# Patient Record
Sex: MALE | Race: WHITE | NOT HISPANIC OR LATINO | Employment: OTHER | URBAN - METROPOLITAN AREA
[De-identification: names, ages, dates, MRNs, and addresses within clinical notes are randomized per-mention and may not be internally consistent; named-entity substitution may affect disease eponyms.]

---

## 2017-08-28 ENCOUNTER — TELEPHONE (OUTPATIENT)
Dept: NEUROLOGY | Facility: CLINIC | Age: 62
End: 2017-08-28

## 2017-08-28 NOTE — TELEPHONE ENCOUNTER
----- Message from Alis Tavares sent at 8/25/2017  2:06 PM CDT -----  Patient is requesting to schedule, was referred by Dr. Redman for Vertigo, contact patient to advise if  will be able to see him for this issue, [phone].    Thank you

## 2017-08-31 ENCOUNTER — HOSPITAL ENCOUNTER (OUTPATIENT)
Dept: RADIOLOGY | Facility: HOSPITAL | Age: 62
Discharge: HOME | End: 2017-08-31
Attending: PHYSICAL MEDICINE & REHABILITATION
Payer: COMMERCIAL

## 2017-08-31 DIAGNOSIS — R42 VERTIGO: ICD-10-CM

## 2017-08-31 PROCEDURE — 72141 MRI NECK SPINE W/O DYE: CPT | Mod: TC

## 2017-08-31 PROCEDURE — 72141 MRI NECK SPINE W/O DYE: CPT | Mod: 26,,, | Performed by: RADIOLOGY

## 2017-09-07 PROBLEM — R42 VERTIGO: Chronic | Status: ACTIVE | Noted: 2017-09-07

## 2017-09-21 ENCOUNTER — TELEPHONE (OUTPATIENT)
Dept: NEUROLOGY | Facility: CLINIC | Age: 62
End: 2017-09-21

## 2017-09-21 ENCOUNTER — LAB VISIT (OUTPATIENT)
Dept: LAB | Facility: HOSPITAL | Age: 62
End: 2017-09-21
Attending: PSYCHIATRY & NEUROLOGY
Payer: COMMERCIAL

## 2017-09-21 ENCOUNTER — OFFICE VISIT (OUTPATIENT)
Dept: NEUROLOGY | Facility: CLINIC | Age: 62
End: 2017-09-21
Payer: COMMERCIAL

## 2017-09-21 VITALS
HEART RATE: 61 BPM | HEIGHT: 73 IN | DIASTOLIC BLOOD PRESSURE: 98 MMHG | BODY MASS INDEX: 28.68 KG/M2 | SYSTOLIC BLOOD PRESSURE: 151 MMHG | RESPIRATION RATE: 20 BRPM | WEIGHT: 216.38 LBS

## 2017-09-21 DIAGNOSIS — R42 VERTIGO: ICD-10-CM

## 2017-09-21 DIAGNOSIS — R42 VERTIGO: Primary | ICD-10-CM

## 2017-09-21 LAB
CREAT SERPL-MCNC: 1.1 MG/DL
EST. GFR  (AFRICAN AMERICAN): >60 ML/MIN/1.73 M^2
EST. GFR  (NON AFRICAN AMERICAN): >60 ML/MIN/1.73 M^2

## 2017-09-21 PROCEDURE — 82565 ASSAY OF CREATININE: CPT

## 2017-09-21 PROCEDURE — 3080F DIAST BP >= 90 MM HG: CPT | Mod: S$GLB,,, | Performed by: PSYCHIATRY & NEUROLOGY

## 2017-09-21 PROCEDURE — 36415 COLL VENOUS BLD VENIPUNCTURE: CPT | Mod: PO

## 2017-09-21 PROCEDURE — 3077F SYST BP >= 140 MM HG: CPT | Mod: S$GLB,,, | Performed by: PSYCHIATRY & NEUROLOGY

## 2017-09-21 PROCEDURE — 3008F BODY MASS INDEX DOCD: CPT | Mod: S$GLB,,, | Performed by: PSYCHIATRY & NEUROLOGY

## 2017-09-21 PROCEDURE — 99999 PR PBB SHADOW E&M-EST. PATIENT-LVL V: CPT | Mod: PBBFAC,,, | Performed by: PSYCHIATRY & NEUROLOGY

## 2017-09-21 PROCEDURE — 99205 OFFICE O/P NEW HI 60 MIN: CPT | Mod: S$GLB,,, | Performed by: PSYCHIATRY & NEUROLOGY

## 2017-09-21 RX ORDER — MECLIZINE HYDROCHLORIDE 25 MG/1
25 TABLET ORAL 3 TIMES DAILY PRN
Qty: 30 TABLET | Refills: 3 | Status: SHIPPED | OUTPATIENT
Start: 2017-09-21 | End: 2019-08-16

## 2017-09-21 NOTE — PROGRESS NOTES
Date: 9/21/2017    Patient ID: Michael Rivero is a 62 y.o. male.    Referring Provider:  Patrick Sotomayor MD    Chief Complaint: Dizziness      History of Present Illness:  Mr. Rivero is a 62 y.o. male with hypertension and gout who presents referred by Patrick Sotomayor MD today with vertigo.     He states he has had vertigo for the past 4-5 years. He first noticed this when driving and for the most part over the past 4-5 years, it has been intermittent. He describes it as a sensation that the world is moving around him or that he is swimming. This typically happens a few times per day and lasts seconds-minutes. However, about 2 months ago, he had a period of 2-3 weeks where the vertigo was more persistent and more severe. He still did not have trouble walking or any other complaints but generally felt unwell. This has since passed and he is back to having a few short bouts of vertigo feeling daily. He has had to be on short term disability from work due to the severity of the dizziness for those 2-3 weeks but wishes to return to work. Dr. Sotomayor has asked that he be seen by neurology to clear him returning to work.     No headaches. No trouble walking. No falls. No nausea or vomiting. No vision changes with this. No incoordination of hands or legs that he has noticed. He occasionally noticed skipped heartbeats. No hearing loss or ringing in the ears.     He has not had a MRI brain. He had a MRI c spine with some narrowing but no significant cord impingement. He has some occasional neck pain but not something significantly bothersome. He has not seen ENT. He has not taken meclizine. He has not seen vestibular therapy.     Review of Systems:   Comprehensive review of systems is negative except as mentioned in the HPI    Allergies:  Review of patient's allergies indicates:  No Known Allergies    Current Medications:  Current Outpatient Prescriptions   Medication Sig Dispense Refill    allopurinol (ZYLOPRIM) 100  "MG tablet Take 1 tablet (100 mg total) by mouth once daily. 90 tablet 1    metoprolol succinate (TOPROL-XL) 25 MG 24 hr tablet Take 25 mg by mouth once daily.  5    olmesartan (BENICAR) 20 MG tablet Take 1 tablet (20 mg total) by mouth once daily. 1/2 tablet qd 90 tablet 0    meclizine (ANTIVERT) 25 mg tablet Take 1 tablet (25 mg total) by mouth 3 (three) times daily as needed for Dizziness. 30 tablet 3     No current facility-administered medications for this visit.        Past Medical History:  Past Medical History:   Diagnosis Date    Arthritis     gout    Gout     Hypertension     Skin cancer     pre cancer on nose , under care of dermatologist        Past Surgical History:  Past Surgical History:   Procedure Laterality Date    CHOLECYSTECTOMY         Family History:  family history includes COPD in his mother; Emphysema in his mother; Gout in his father; Hyperlipidemia in his brother.    Social History:   reports that he has never smoked. He has never used smokeless tobacco. He reports that he drinks about 0.6 oz of alcohol per week . He reports that he does not use drugs.    Physical Exam:  Vitals:    09/21/17 0954 09/21/17 1055   BP: (!) 181/99 (!) 151/98   Pulse: 62 61   Resp: 20    Weight: 98.1 kg (216 lb 6.1 oz)    Height: 6' 1" (1.854 m)    PainSc: 0-No pain      Body mass index is 28.55 kg/m².  General: Well developed, well nourished.  No acute distress.  HEENT: Atraumatic, normocephalic.  Cardiovascular: No carotid bruits.   Musculoskeletal: No obvious joint deformities, moves all extremities well.  Skin: No obvious rashes      Neurological Exam:  Mental status: Awake, alert, and oriented to person, place, and time. Recent and remote memory appear to be intact.   Speech/Language: No dysarthria or aphasia on conversation.   Cranial nerves: Pupils equal round and reactive to light, no nystagmus, extraocular movements intact, facial strength and sensation intact bilaterally, palate and tongue " midline, hearing grossly intact bilaterally.  Motor: 5 out of 5 strength throughout the upper and lower extremities bilaterally. Normal bulk and tone.   Sensation: Intact to light touch and temperature bilaterally.  DTR: 2+ at the knees and biceps bilaterally.  Coordination: Finger-nose-finger testing and rapid alternating movements normal bilaterally. No tremor.   Gait: Normal gait including heel, toe, and tandem gait though he had slight difficulty with tandem gait.       Data:  I have personally reviewed the referring provider's notes, labs, & imaging made available to me today.       Labs:  CBC:   Lab Results   Component Value Date    WBC 7.8 08/08/2017    HGB 15.4 08/08/2017    HCT 45.2 08/08/2017     08/08/2017    MCV 94.4 08/08/2017    RDW 12.3 08/08/2017     BMP:   Lab Results   Component Value Date     08/08/2017    K 4.2 08/08/2017     08/08/2017    CO2 28 08/08/2017    BUN 11 08/08/2017    CREATININE 1.08 08/08/2017    GLU 88 08/08/2017    CALCIUM 9.1 08/08/2017     LFTS;   Lab Results   Component Value Date    PROT 6.8 08/08/2017    ALBUMIN 4.4 08/08/2017    BILITOT 2.1 (H) 08/08/2017    AST 20 08/08/2017    ALKPHOS 84 08/08/2017    ALT 21 08/08/2017     COAGS: No results found for: INR, PROTIME, PTT  FLP:   Lab Results   Component Value Date    CHOL 183 08/08/2017    HDL 54 08/08/2017    LDLCALC 110 08/08/2017    TRIG 96 08/08/2017    CHOLHDL 3.4 08/08/2017         Imaging:  I have personally reviewed the imaging, MRI c spine reveals multilevel narrowing and foraminal narrowing.     Assessment and Plan:  Mr. Rivero is a 62 y.o. male referred to me by Patrick Sotomayor MD for evaluation of vertigo. The patient has no physical exam findings consistent with either peripheral or central vertigo (no nystagmus, no ataxia, etc). I was unable to provoke vertigo on testing. Per the history, I believe this is likely BPPV but I would like to further evaluate given the more severe bout that  lasted 2-3 weeks. I will obtain MRI brain and vessel imaging. I have prescribed meclizine to help symptomatically. If his imaging is normal, we can proceed with vestibular therapy. A visit with ENT or the Epley maneuver may benefit him in the future if this continues. I do not see anything on his exam today that should limit him returning to work.     We will contact the patient with the results. All questions were answered and they were comfortable with the plan.       Vertigo  -     MRI Brain W WO Contrast; Future; Expected date: 09/21/2017  -     MRA Neck W WO Contrast; Future; Expected date: 09/21/2017  -     MRA Brain; Future; Expected date: 09/21/2017  -     Vestibular test; Future  -     Ambulatory consult to Physical Therapy  -     Creatinine, serum; Future; Expected date: 09/21/2017    Other orders  -     meclizine (ANTIVERT) 25 mg tablet; Take 1 tablet (25 mg total) by mouth 3 (three) times daily as needed for Dizziness.  Dispense: 30 tablet; Refill: 3      The patient was noted to be hypertensive in clinic today.  Repeat BP check confirmed this.  I have asked the patient to follow up with their PCP about this and my staff to message the patient's PCP.

## 2017-09-21 NOTE — LETTER
September 21, 2017      Patrick Sotomayor MD  [address] [place] [address]           [facility]  [address] [facility] [address] [place] [address]  Phone: [phone]  Fax: [phone]          Patient: Michael Rivero   MR Number: [id number]   YOB: 1955   Date of Visit: 9/21/2017       Dear Dr. aPtrick Sotomayor:    Thank you for referring Michael Rivero to me for evaluation. Attached you will find relevant portions of my assessment and plan of care.    If you have questions, please do not hesitate to call me. I look forward to following Michael Rivero along with you.    Sincerely,    Charity Davis MD    Enclosure  CC:  No Recipients    If you would like to receive this communication electronically, please contact [email or web address] or [phone] to request more information on [employer] Link access.    For providers and/or their staff who would like to refer a patient to [facility], please contact us through our one-stop-shop provider referral line, [facility], at [phone].    If you feel you have received this communication in error or would no longer like to receive these types of communications, please e-mail [email or web address]

## 2017-09-21 NOTE — PATIENT INSTRUCTIONS
Vertigo (Unknown Cause)    In addition to helping with hearing, the inner ear is part of the balance center of your body. Problems with the inner ear can a false feeling of motion. This is called vertigo. Often, it feels as if you or the room is spinning. A vertigo attack may cause sudden nausea, vomiting and heavy sweating. Severe vertigo causes a loss of balance and can cause you to fall. During vertigo, small head movements and changes in body position will often make the symptoms worse. You may also have ringing in the ears called tinnitus.  An episode of vertigo may last seconds, minutes or hours. Once you are over the first episode, it may never come back. However, symptoms may return off and on.  The cause of your vertigo is not yet known. Possible causes of vertigo include:  · Inflammation of the inner ear  · Disease of the nerves to the inner ear  · Movement of calcium particles in the inner ear  · Poor blood flow to the balance centers of the brain  · Migraine headaches  Home care  · If symptoms are severe, rest quietly in bed. Change positions very slowly. There is usually one position that will feel best, such as lying on one side or lying on your back with your head slightly raised on pillows.  · Do not drive a car or work with dangerous machinery until symptoms have been gone for at least one week.  · Take medicine as prescribed to relieve your symptoms. Unless another medicine was prescribed for symptoms of nausea, vomiting, and dizziness, you may use over-the-counter motion sickness pills. Ask your pharmacist for suggestions.  Follow-up care  Follow up with your healthcare provider or as directed. If you are referred to a specialist or for testing, make the appointment promptly.  When to seek medical advice  Call your healthcare provider if any of the following occur:  · Fever of 100.4°F (38ºC) or higher, or as directed by your healthcare provider  · Vertigo worsens or is not controlled  by prescribed medicine   · Repeated vomiting not relieved by prescribed medicine   · Severe headache  · Confusion  · Weakness of an arm or leg or one side of the face  · Difficulty with speech or vision  · Loss of consciousness   · Seizure  Date Last Reviewed: 8/16/2015 © 2000-2017 [employer]. [address] [place] [address]. All rights reserved. This information is not intended as a substitute for professional medical care. Always follow your healthcare professional's instructions.        Dizziness (Vertigo) and Balance Problems: Diagnostic Tests    An otolaryngologist (also called an ENT) is a doctor who specializes in disorders of the ear, nose, and throat. Your ENT can help find clues to the cause of your dizziness. He or she will examine you and go over your health history. Your ENT may also order certain tests to help diagnose your problem.  Hearing testing  In most cases, you will be referred for hearing testing. This is because the nerve that sends balance signals also sends hearing signals. A problem that affects balance can also affect hearing.  Other tests  Your doctor may recommend more than one kind of test. The following tests are painless, but may cause dizziness in some cases.  · MRI creates images of the ear or head. A magnetic field and contrast medium are used to make the image.  · Electronystagmography (ENG) records eye movement. Small electrodes are put on the skin around your eyes. Then your ear is filled with warm or cold water.  · Rotation tests show the relationship between the inner ear and your eyes. You may be asked to wear special goggles or sit in a computerized chair.  · Posturography tests your standing balance under different conditions. You will stand on a platform that measures shifts in your body weight.   · Electrocochleography (ECoG) measures the fluid pressure in the inner ear. An abnormal ECoG may mean you have Meniere's disease or other  conditions.  · Vestibular evoked myogenic potentials (VEMPs) may be used if your healthcare provider suspects a rare condition like superior semicircular canal dehiscence. Electrodes are placed on your neck, and you hear clicks in your ear.  Date Last Reviewed: 11/1/2016  © 7683-7811 [employer]. [address] [place] [address], [place] [address]. All rights reserved. This information is not intended as a substitute for professional medical care. Always follow your healthcare professional's instructions.

## 2017-09-28 ENCOUNTER — HOSPITAL ENCOUNTER (OUTPATIENT)
Dept: RADIOLOGY | Facility: HOSPITAL | Age: 62
Discharge: HOME | End: 2017-09-28
Attending: PSYCHIATRY & NEUROLOGY
Payer: COMMERCIAL

## 2017-09-28 DIAGNOSIS — R42 VERTIGO: ICD-10-CM

## 2017-09-28 PROCEDURE — 70553 MRI BRAIN STEM W/O & W/DYE: CPT | Mod: TC

## 2017-09-28 PROCEDURE — 25500020 PHARM REV CODE 255: Performed by: PSYCHIATRY & NEUROLOGY

## 2017-09-28 PROCEDURE — 70553 MRI BRAIN STEM W/O & W/DYE: CPT | Mod: 26,,, | Performed by: RADIOLOGY

## 2017-09-28 PROCEDURE — 70549 MR ANGIOGRAPH NECK W/O&W/DYE: CPT | Mod: 26,,, | Performed by: RADIOLOGY

## 2017-09-28 PROCEDURE — 70549 MR ANGIOGRAPH NECK W/O&W/DYE: CPT | Mod: TC

## 2017-09-28 PROCEDURE — A9585 GADOBUTROL INJECTION: HCPCS | Performed by: PSYCHIATRY & NEUROLOGY

## 2017-09-28 PROCEDURE — 70544 MR ANGIOGRAPHY HEAD W/O DYE: CPT | Mod: TC

## 2017-09-28 RX ORDER — GADOBUTROL 604.72 MG/ML
9 INJECTION INTRAVENOUS
Status: COMPLETED | OUTPATIENT
Start: 2017-09-28 | End: 2017-09-28

## 2017-09-28 RX ORDER — GADOBUTROL 604.72 MG/ML
INJECTION INTRAVENOUS
Status: DISCONTINUED
Start: 2017-09-28 | End: 2017-09-29 | Stop reason: HOSPADM

## 2017-09-28 RX ADMIN — GADOBUTROL 9 ML: 604.72 INJECTION INTRAVENOUS at 05:09

## 2017-10-04 ENCOUNTER — CLINICAL SUPPORT (OUTPATIENT)
Dept: REHABILITATION | Facility: HOSPITAL | Age: 62
End: 2017-10-04
Attending: PSYCHIATRY & NEUROLOGY
Payer: COMMERCIAL

## 2017-10-04 DIAGNOSIS — R42 VERTIGO: Chronic | ICD-10-CM

## 2017-10-04 PROCEDURE — 97162 PT EVAL MOD COMPLEX 30 MIN: CPT | Mod: PO

## 2017-10-04 PROCEDURE — 97112 NEUROMUSCULAR REEDUCATION: CPT | Mod: PO

## 2017-10-04 NOTE — PLAN OF CARE
PHYSICAL THERAPY EVALUATION     Name: Michael Rivero  Date: 10/04/2017  Referring Physician: Charity Davis MD  Visit #: 1   Start Time:  900  Stop Time:  100  Treatment time: 60 min     By Department     none Charity Davis MD [facility]   Priority Start Date Expiration Date Referral Entered By   Routine 09/25/2017 12/31/2017 Charity Davis MD   Visits Requested Visits Authorized Visits Completed Visits Scheduled   1 20 1 0   Procedure Information     Procedure Modifiers Provider Requested Approved   [id number] - Ambulatory consult to Physical Therapy   1 1   Diagnosis Information     Diagnosis   R42 (ICD-10-CM) - Vertigo   Referral Notes   Number of Notes: 2   Type Date User Summary Attachment   General 09/25/2017 11:45 AM Janet Brewer Approved        History     History of Present Illness: Pt  states he has had vertigo for the past 4-5 years. He first noticed this when driving and for the most part over the past 4-5 years, it has been intermittent. He describes it as a sensation that the world is moving around him and that he feels a bit disoriented.  this typically happens a few times per day and lasts seconds-minutes.  2 months ago, he had a period of 2-3 weeks where the vertigo was more persistent and more severe. He still did not have trouble walking or any other complaints but generally felt unwell. This has since passed and he is back to having a few short bouts of vertigo feeling daily. He has had to be on short term disability from work due to the severity of the dizziness for those 2-3 weeks but wishes to return to work.     Primary Diagnosis: vertigo    Past Medical History:   Diagnosis Date    Arthritis     gout    Gout     Hypertension     Skin cancer     pre cancer on nose , under care of dermatologist      Precautions: standard  Prior Therapy: no  Diagnostic Tests: MRI of brain is normal  Prior level of function: independent  Sports/Recreational Activities: occasionally  "plays golf  Work status: returning to work next week after being off ~ 2 months with vertigo  Self care: I  Patient Therapy Goals: return to work  Living situation:   Assistive devices/equipment none  Subjective     Pt reports that for years he has experienced a sensation that the world is moving around him and he feels 'disoriented"  A few months July /August  ago he had an episode that began while on vacation at the beach in [place] (no flight). He recalls on 2 separate instances he had turned head rapidly precipitating vertigo.      Attention Span and Concentration:  Normal  Do you have difficulty reading?  no  Do you have more problems with balance./vertigo in the dark? no  Do you have numbness and tingling anywhere?  no  Is this the first time you have had a problem with vertigo?  Present 4-5 years    How many falls have you had in the last year? no  Where were you and what were you doing when you fell? na  Did you feel lightheaded or dizzy? lightheaded  Did you lose consciousness? no  Have you almost fallen or lost your balance? no  Does the patient have any concerns of abuse  no  Have you had recent dental work?  no  Exercise routine prior to onset  :recently began 30 minutes on ellyptical daily    Currently vertigo is 3/10 on 1-10 range.    At worst 7/10 at best 0/10  Mental status: alert, oriented to person, place, and time  Behavior:  calm  Pain:na    Objective   /96-- on BP meds and is working with MD to regulate  Posture:rounded shoulders  Does body habitus limit mobility?  no  OCULOMOTOR TESTING (CN 3,4,6)       Smooth pursuit:          no symptoms                           no nystagmus       Saccades  Vertical     no symptoms                           no nystagmus       Convergence              no symptoms at  20 cm          no nystagmus        VOR                           No symptoms                          No nystagmus    CERVICAL  ROM WNL     SHARP -VELASQUEZ test of Tranverse Ligament:  " Normal  ALAR LIGAMENT STRESS  Test of stability of C2 motion:    Cervical compress: neg symptoms  Cervical distraction:  neg symptoms         Coordination:   UEs: WNL  LEs: WNL  Balance Assessment:   BETHEA Assessment    1. Sitting to Standing   4 - able to stand without using hands and stabilize independently  2. Standing Unsupported   4 - able to stand safely 2 minutes without hold  3. Sitting Unsupported   4 - able to sit safely and securely 2 minutes  4. Standing to Sitting   4 - sits safely with minimal use of hands  5. Pivot Transfer   4 - able to trnasfer safely with minor use of hands  6. Standing with Eyes Closed   4 - albe to stand 10 seconds safely  7. Standing with Feet Together   4 - able to place feet together independently and stand 1 minute safely  8. Reaching Forward with Outstretched Arm   4 - can reach forward confidently 25 cm/10 inches  9. Retrieving Object from Floor   4 - able to  slipper safely and easily  10. Turning to Look Behind   4 - looks behind from both sides and weights shifts well  11. Turning 360 Degrees   4 - able to turn 360 in seconds or less  12. Placing Alternate Foot on Step   4 - able to stand independently safely and complete 8 steps in 20 seconds  13. Standing with One Foot in Front   4 - able to tandem stand independently and hold 30 seconds  14. Standing on One Foot   3- able to lift leg independently and hold 5-10 seconds  Total: 55  Maximum: 56     Treatment:  Evaluation completed:   NEUROMUSCULAR RE-EDUCATION AND BALANCE COORDINATION TRAINING for  10 minutes to include:  Vertical, oblique  and horizontal saccades x 1 minute  VOR x 1 minute    EDUCATION:  Pt receieved written home exercise program consisting of exercises in bold above which was reviewed and pt was able to return demonstration and expressed understanding that the HEP sould be done at least 5 times a week in order to achieve max results from therapy.   Fall prevention techniques were discussed and  "patientand/or family agreed to utilize them to make the home environment safer.  Therre were no identified Mandaen, social, cultural, language or learning needs that could affect therapy.       Assessment     Initial Assessment:  Patient presents with chronic sense of 'discomfort or uneasiness" due to a sense of vertigo that has been present for 4-5 years without known precipitating cause.  He had an exacerbation of symptoms 2 months ago which necessitated him going out on STD.  He is now ready to return to work and feels his symptoms are manageable  He will benefit from an HEP and continued OP to further develop a comprehensive balance and vertigo program.    Problems include visual discomfort/disturbed with increased sense of vertigo in busy environments,     Short Term Goals (4 weeks):   Pt to report compliance with HEP  Independent performance of HEP without provocation of symptoms  Pt to tolerate visual exercises against a busy background  Pt to report ability to grocery shop without increased symptoms    Long Term Goals (8 Weeks):   Pt to report less frequent and less intense symptoms at home and at work  Pt to accept all challenges to ambulation on level, inclines, compliant surfaces without sense of discomfort/vertigo    Plan   Pt will be seen  1-2 times per week for up to 12 weeks. Recommended Treatment Plan will include:  Neuromuscular re-education    Individualized Home Exercise Program   Pt education    Recommendations at time of discharge from PT: Discharge to HEP/community fitness    Therapist: Shoshana Alvarenga, PT    "

## 2017-10-04 NOTE — PROGRESS NOTES
PHYSICAL THERAPY EVALUATION     Name: Michael Rivero  Date: 10/04/2017  Referring Physician: Charity Davis MD  Visit #: 1   Start Time:  900  Stop Time:  100  Treatment time: 60 min     By Department     none Charity Davis MD [facility] NEUROLOGY   Priority Start Date Expiration Date Referral Entered By   Routine 09/25/2017 12/31/2017 Charity Davis MD   Visits Requested Visits Authorized Visits Completed Visits Scheduled   1 20 1 0     Diagnosis   R42 (ICD-10-CM) - Vertigo     History    Pt  states he has had vertigo for the past 4-5 years. He first noticed this when driving and for the most part over the past 4-5 years, it has been intermittent. He describes it as a sensation that the world is moving around him and that he feels a bit disoriented.  this typically happens a few times per day and lasts seconds-minutes.  2 months ago, he had a period of 2-3 weeks where the vertigo was more persistent and more severe. He still did not have trouble walking or any other complaints but generally felt unwell. This has since passed and he is back to having a few short bouts of vertigo feeling daily. He has had to be on short term disability from work due to the severity of the dizziness for those 2-3 weeks but wishes to return to work.     Primary Diagnosis: vertigo    Past Medical History:   Diagnosis Date    Arthritis     gout    Gout     Hypertension     Skin cancer     pre cancer on nose , under care of dermatologist      Precautions: standard  Prior Therapy: no  Diagnostic Tests: MRI of brain is normal  Prior level of function: independent  Sports/Recreational Activities: occasionally plays golf  Work status: returning to work next week after being off ~ 2 months with vertigo  Self care: I  Patient Therapy Goals: return to work  Living situation:   Assistive devices/equipment none  Subjective     Pt reports that for years he has experienced a sensation that the world is moving around him and he  "feels 'disoriented"  A few months July /August  ago he had an episode that began while on vacation at the beach in [place] (no flight). He recalls on 2 separate instances he had turned head rapidly precipitating vertigo.      Attention Span and Concentration:  Normal  Do you have difficulty reading?  no  Do you have more problems with balance./vertigo in the dark? no  Do you have numbness and tingling anywhere?  no  Is this the first time you have had a problem with vertigo?  Present 4-5 years    How many falls have you had in the last year? no  Where were you and what were you doing when you fell? na  Did you feel lightheaded or dizzy? lightheaded  Did you lose consciousness? no  Have you almost fallen or lost your balance? no  Does the patient have any concerns of abuse  no  Have you had recent dental work?  no  Exercise routine prior to onset  :recently began 30 minutes on ellyptical daily    Currently vertigo is 3/10 on 1-10 range.    At worst 7/10 at best 0/10  Mental status: alert, oriented to person, place, and time  Behavior:  calm  Pain:na    Objective   /96-- on BP meds and is working with MD to regulate  Posture:rounded shoulders  Does body habitus limit mobility?  no  OCULOMOTOR TESTING (CN 3,4,6)       Smooth pursuit:          no symptoms                           no nystagmus       Saccades  Vertical     no symptoms                           no nystagmus       Convergence              no symptoms at  20 cm          no nystagmus        VOR                           No symptoms                          No nystagmus    CERVICAL  ROM WNL     SHARP -VELASQUEZ test of Tranverse Ligament:  Normal  ALAR LIGAMENT STRESS  Test of stability of C2 motion:    Cervical compress: neg symptoms  Cervical distraction:  neg symptoms         Coordination:   UEs: WNL  LEs: WNL  Balance Assessment:   BETHEA Assessment    1. Sitting to Standing   4 - able to stand without using hands and stabilize independently  2. Standing " "Unsupported   4 - able to stand safely 2 minutes without hold  3. Sitting Unsupported   4 - able to sit safely and securely 2 minutes  4. Standing to Sitting   4 - sits safely with minimal use of hands  5. Pivot Transfer   4 - able to trnasfer safely with minor use of hands  6. Standing with Eyes Closed   4 - albe to stand 10 seconds safely  7. Standing with Feet Together   4 - able to place feet together independently and stand 1 minute safely  8. Reaching Forward with Outstretched Arm   4 - can reach forward confidently 25 cm/10 inches  9. Retrieving Object from Floor   4 - able to  slipper safely and easily  10. Turning to Look Behind   4 - looks behind from both sides and weights shifts well  11. Turning 360 Degrees   4 - able to turn 360 in seconds or less  12. Placing Alternate Foot on Step   4 - able to stand independently safely and complete 8 steps in 20 seconds  13. Standing with One Foot in Front   4 - able to tandem stand independently and hold 30 seconds  14. Standing on One Foot   3- able to lift leg independently and hold 5-10 seconds  Total: 55  Maximum: 56     Treatment:  Evaluation completed:   NEUROMUSCULAR RE-EDUCATION AND BALANCE COORDINATION TRAINING for  10 minutes to include:  Vertical, oblique  and horizontal saccades x 1 minute  VOR x 1 minute    EDUCATION:  Pt receieved written home exercise program consisting of exercises in bold above which was reviewed and pt was able to return demonstration and expressed understanding that the HEP sould be done at least 5 times a week in order to achieve max results from therapy.   Fall prevention techniques were discussed and patientand/or family agreed to utilize them to make the home environment safer.  Therre were no identified Alevism, social, cultural, language or learning needs that could affect therapy.       Assessment     Initial Assessment:  Patient presents with chronic sense of 'discomfort or uneasiness" due to a sense of vertigo " that has been present for 4-5 years without known precipitating cause.  He had an exacerbation of symptoms 2 months ago which necessitated him going out on STD.  He is now ready to return to work and feels his symptoms are manageable  He will benefit from an HEP and continued OP to further develop a comprehensive balance and vertigo program.    Problems include visual discomfort/disturbed with increased sense of vertigo in busy environments,     Short Term Goals (4 weeks):   Pt to report compliance with HEP  Independent performance of HEP without provocation of symptoms  Pt to tolerate visual exercises against a busy background  Pt to report ability to grocery shop without increased symptoms    Long Term Goals (8 Weeks):   Pt to report less frequent and less intense symptoms at home and at work  Pt to accept all challenges to ambulation on level, inclines, compliant surfaces without sense of discomfort/vertigo    Plan   Pt will be seen  1-2 times per week for up to 12 weeks. Recommended Treatment Plan will include:  Neuromuscular re-education    Individualized Home Exercise Program   Pt education    Recommendations at time of discharge from PT: Discharge to HEP/community fitness  History  Co-morbidities and personal factors that may impact the plan of care Examination  Body Structures and Functions, activity limitations and participation restrictions that may impact the plan of care    Clinical Presentation   Co-morbidities:   HTN        Personal Factors:   wants to return to work Body Regions:   vestibular    Body Systems:    neurological            Participation Restrictions:   Work schedule     Activity limitations:   Learning and applying knowledge  no deficits    General Tasks and Commands  no deficits    Communication  no deficits    Mobility  no deficits    Self care  no deficits    Domestic Life  no deficits    Interactions/Relationships  no deficits    Life Areas  employment    Community and Social  Life  recreation and leisure         evolving clinical presentation with changing clinical characteristics                      moderate   moderate  moderate Decision Making/ Complexity Score:  moderate         Therapist: Shoshana Alvarenga PT

## 2017-12-07 ENCOUNTER — DOCUMENTATION ONLY (OUTPATIENT)
Dept: REHABILITATION | Facility: HOSPITAL | Age: 62
End: 2017-12-07

## 2017-12-08 NOTE — PROGRESS NOTES
PHYSICAL THERAPY  Discharge  Date of Discharge 12/7/2017    Name: Michael Rivero  [facility] #: [id number]    Pt was seen in Physical Therapy for initial evaluation on:10/4/17  Pt's last date of treatment in Physical Therapy was:10/4/17  Number of treatment sessions completed: 1  Reason for discharge:    self discharge/reason unnown  Status at Discharge:  Goals not met     Discharge update in functional G code not available due to unplanned discharge.

## 2019-01-18 PROBLEM — E78.2 MIXED HYPERLIPIDEMIA: Status: ACTIVE | Noted: 2019-01-18

## 2019-09-24 NOTE — H&P
Subjective:     iintolerable pain and swelling right knee  With occasional popping admitted for knee arthroscopy    Patient Active Problem List    Diagnosis Date Noted    Mixed hyperlipidemia 01/18/2019    Vertigo 09/07/2017    HTN (hypertension) 10/16/2014    Gout 10/16/2014     Past Medical History:   Diagnosis Date    Arthritis     gout    Gout     Hypertension     Mixed hyperlipidemia 1/18/2019    Skin cancer     pre cancer on nose , under care of dermatologist       Past Surgical History:   Procedure Laterality Date    ADENOIDECTOMY      CHOLECYSTECTOMY      COLONOSCOPY      COSMETIC SURGERY  02/28/2018    Varicose vein removed from L leg     TONSILLECTOMY        No medications prior to admission.     Review of patient's allergies indicates:  No Known Allergies   Social History     Tobacco Use    Smoking status: Never Smoker    Smokeless tobacco: Never Used   Substance Use Topics    Alcohol use: Yes     Alcohol/week: 1.0 standard drinks     Types: 1 Cans of beer per week     Comment: 3 beer or less per week      Family History   Problem Relation Age of Onset    Emphysema Mother     COPD Mother     Early death Mother     Gout Father     Hyperlipidemia Brother     Arthritis Maternal Grandmother     Hypertension Maternal Grandmother       Review of Systems  Pertinent items are noted in HPI.    Objective:     No data found.  Heart regular lungs clear tenderness medial joint line    Imaging Review  Chondrocalcinosis with medial meniscal tear    Assessment:     There are no hospital problems to display for this patient.      Plan:     The various methods of treatment have been discussed with the patient and family.   After consideration of risks, benefits and other options for treatment, the patient has consented to surgical interventions (significant risks of progressive arthritic complaints discussed).  Questions were answered and Pre-op teaching was done by me.

## 2019-09-26 ENCOUNTER — ANESTHESIA EVENT (OUTPATIENT)
Dept: SURGERY | Facility: OTHER | Age: 64
End: 2019-09-26
Payer: COMMERCIAL

## 2019-09-26 ENCOUNTER — HOSPITAL ENCOUNTER (OUTPATIENT)
Dept: PREADMISSION TESTING | Facility: OTHER | Age: 64
Discharge: HOME | End: 2019-09-26
Attending: ORTHOPAEDIC SURGERY
Payer: COMMERCIAL

## 2019-09-26 VITALS
HEIGHT: 72 IN | BODY MASS INDEX: 28.44 KG/M2 | OXYGEN SATURATION: 95 % | HEART RATE: 50 BPM | WEIGHT: 210 LBS | DIASTOLIC BLOOD PRESSURE: 87 MMHG | SYSTOLIC BLOOD PRESSURE: 147 MMHG | TEMPERATURE: 97 F

## 2019-09-26 RX ORDER — HYDROMORPHONE HYDROCHLORIDE 2 MG/ML
0.4 INJECTION, SOLUTION INTRAMUSCULAR; INTRAVENOUS; SUBCUTANEOUS EVERY 5 MIN PRN
Status: CANCELLED | OUTPATIENT
Start: 2019-09-26

## 2019-09-26 RX ORDER — SODIUM CHLORIDE 0.9 % (FLUSH) 0.9 %
3 SYRINGE (ML) INJECTION
Status: DISCONTINUED | OUTPATIENT
Start: 2019-09-26 | End: 2019-09-27 | Stop reason: HOSPADM

## 2019-09-26 RX ORDER — ONDANSETRON 2 MG/ML
4 INJECTION INTRAMUSCULAR; INTRAVENOUS DAILY PRN
Status: CANCELLED | OUTPATIENT
Start: 2019-09-26

## 2019-09-26 RX ORDER — MEPERIDINE HYDROCHLORIDE 25 MG/ML
12.5 INJECTION INTRAMUSCULAR; INTRAVENOUS; SUBCUTANEOUS ONCE AS NEEDED
Status: CANCELLED | OUTPATIENT
Start: 2019-09-26 | End: 2019-09-27

## 2019-09-26 RX ORDER — OXYCODONE HYDROCHLORIDE 5 MG/1
5 TABLET ORAL
Status: CANCELLED | OUTPATIENT
Start: 2019-09-26

## 2019-09-26 NOTE — DISCHARGE INSTRUCTIONS
PRE-ADMIT TESTING -  [phone]    [address]  [place] [facility]          Your surgery has been scheduled at [facility]. We are pleased to have the opportunity to serve you. For Further Information please call [phone].    On the day of surgery please report to the Information Desk on the 1st floor.    · CONTACT YOUR PHYSICIAN'S OFFICE THE DAY PRIOR TO YOUR SURGERY TO OBTAIN YOUR ARRIVAL TIME.     · The evening before surgery do not eat anything after 9 p.m. ( this includes hard candy, chewing gum and mints).  You may only have GATORADE, POWERADE AND WATER  from 9 p.m. until you leave your home.   DO NOT DRINK ANY LIQUIDS ON THE WAY TO THE HOSPITAL.      SPECIAL MEDICATION INSTRUCTIONS: TAKE medications checked off by the Anesthesiologist on your Medication List.    Angiogram Patients: Take medications as instructed by your physician, including aspirin.     Surgery Patients:    If you take ASPIRIN - Your PHYSICIAN/SURGEON will need to inform you IF/OR when you need to stop taking aspirin prior to your surgery.     Do Not take any medications containing IBUPROFEN.  Do Not Wear any make-up or dark nail polish   (especially eye make-up) to surgery. If you come to surgery with makeup on you will be required to remove the makeup or nail polish.    Do not shave your surgical area at least 5 days prior to your surgery. The surgical prep will be performed at the hospital according to Infection Control regulations.    Leave all valuables at home.   Do Not wear any jewelry or watches, including any metal in body piercings. Jewelry must be removed prior to coming to the hospital.  There is a possibility that rings that are unable to be removed may be cut off if they are on the surgical extremity.    Contact Lens must be removed before surgery. Either do not wear the contact lens or bring a case and solution for storage.  Please bring a container for eyeglasses or dentures as required.  Bring  any paperwork your physician has provided, such as consent forms,  history and physicals, doctor's orders, etc.   Bring comfortable clothes that are loose fitting to wear upon discharge. Take into consideration the type of surgery being performed.  Maintain your diet as advised per your physician the day prior to surgery.      Adequate rest the night before surgery is advised.   Park in the Parking lot behind the hospital or in the [facility] across the street from the parking lot. Parking is complimentary.  If you will be discharged the same day as your procedure, please arrange for a responsible adult to drive you home or to accompany you if traveling by taxi.   YOU WILL NOT BE PERMITTED TO DRIVE OR TO LEAVE THE HOSPITAL ALONE AFTER SURGERY.   It is strongly recommended that you arrange for someone to remain with you for the first 24 hrs following your surgery.    The Surgeon will speak to your family/visitor after your surgery regarding the outcome of your surgery and post op care.  The Surgeon may speak to you after your surgery, but there is a possibility you may not remember the details.  Please check with your family members regarding the conversation with the Surgeon.    We strongly recommend whoever is bringing you home be present for discharge instructions.  This will ensure a thorough understanding for your post op home care.      Thank you for your cooperation.  The Staff of [facility].                Bathing Instructions with Hibiclens     Shower the evening before and morning of your procedure with Hibiclens:   Wash your face with water and your regular face wash/soap   Apply Hibiclens directly on your skin or on a wet washcloth and wash gently. When showering: Move away from the shower stream when applying Hibiclens to avoid rinsing off too soon.   Rinse thoroughly with warm water   Do not dilute Hibiclens         Dry off as usual, do not use any deodorant,  powder, body lotions, perfume, after shave or cologne.

## 2019-09-26 NOTE — ANESTHESIA PREPROCEDURE EVALUATION
09/26/2019  Michael Rivero is a 64 y.o., male.    Anesthesia Evaluation    I have reviewed the Patient Summary Reports.    I have reviewed the Nursing Notes.   I have reviewed the Medications.     Review of Systems  Anesthesia Hx:  No problems with previous Anesthesia  Denies Family Hx of Anesthesia complications.   Denies Personal Hx of Anesthesia complications.   Social:  Non-Smoker    Hematology/Oncology:  Hematology Normal       -- Denies Cancer in past history:    EENT/Dental:EENT/Dental Normal   Cardiovascular:   Exercise tolerance: good Hypertension Dysrhythmias Takes Toprol for PVC's--has them 23% of the time per Halter monitor   Pulmonary:  Pulmonary Normal    Renal/:  Renal/ Normal     Musculoskeletal:  Musculoskeletal Normal    Neurological:  Neurology Normal    Endocrine:  Endocrine Normal    Dermatological:  Skin Normal    Psych:  Psychiatric Normal           Physical Exam  General:  Well nourished    Airway/Jaw/Neck:  Airway Findings: Mouth Opening: Normal Tongue: Normal  General Airway Assessment: Adult  Mallampati: I  TM Distance: Normal, at least 6 cm  Jaw/Neck Findings:  Neck ROM: Normal ROM      Dental:  Dental Findings: In tact             Anesthesia Plan  Type of Anesthesia, risks & benefits discussed:  Anesthesia Type:  general  Patient's Preference:   Intra-op Monitoring Plan:   Intra-op Monitoring Plan Comments:   Post Op Pain Control Plan:   Post Op Pain Control Plan Comments:   Induction:   IV  Beta Blocker:         Informed Consent: Patient understands risks and agrees with Anesthesia plan.  Questions answered. Anesthesia consent signed with patient.  ASA Score: 2     Day of Surgery Review of History & Physical:    H&P update referred to the surgeon.         Ready For Surgery From Anesthesia Perspective.

## 2019-10-04 ENCOUNTER — HOSPITAL ENCOUNTER (OUTPATIENT)
Facility: OTHER | Age: 64
Discharge: HOME | End: 2019-10-04
Attending: ORTHOPAEDIC SURGERY | Admitting: ORTHOPAEDIC SURGERY
Payer: COMMERCIAL

## 2019-10-04 ENCOUNTER — ANESTHESIA (OUTPATIENT)
Dept: SURGERY | Facility: OTHER | Age: 64
End: 2019-10-04
Payer: COMMERCIAL

## 2019-10-04 VITALS
TEMPERATURE: 98 F | DIASTOLIC BLOOD PRESSURE: 89 MMHG | HEART RATE: 69 BPM | WEIGHT: 210 LBS | RESPIRATION RATE: 18 BRPM | HEIGHT: 72 IN | BODY MASS INDEX: 28.44 KG/M2 | SYSTOLIC BLOOD PRESSURE: 177 MMHG | OXYGEN SATURATION: 99 %

## 2019-10-04 DIAGNOSIS — S83.249A MMT (MEDIAL MENISCUS TEAR): ICD-10-CM

## 2019-10-04 PROCEDURE — 71000015 HC POSTOP RECOV 1ST HR: Performed by: ORTHOPAEDIC SURGERY

## 2019-10-04 PROCEDURE — 36000710: Performed by: ORTHOPAEDIC SURGERY

## 2019-10-04 PROCEDURE — 36000711: Performed by: ORTHOPAEDIC SURGERY

## 2019-10-04 PROCEDURE — 37000008 HC ANESTHESIA 1ST 15 MINUTES: Performed by: ORTHOPAEDIC SURGERY

## 2019-10-04 PROCEDURE — 71000033 HC RECOVERY, INTIAL HOUR: Performed by: ORTHOPAEDIC SURGERY

## 2019-10-04 PROCEDURE — 71000016 HC POSTOP RECOV ADDL HR: Performed by: ORTHOPAEDIC SURGERY

## 2019-10-04 PROCEDURE — 27201423 OPTIME MED/SURG SUP & DEVICES STERILE SUPPLY: Performed by: ORTHOPAEDIC SURGERY

## 2019-10-04 PROCEDURE — 63600175 PHARM REV CODE 636 W HCPCS: Performed by: ORTHOPAEDIC SURGERY

## 2019-10-04 PROCEDURE — 37000009 HC ANESTHESIA EA ADD 15 MINS: Performed by: ORTHOPAEDIC SURGERY

## 2019-10-04 PROCEDURE — 63600175 PHARM REV CODE 636 W HCPCS: Performed by: ANESTHESIOLOGY

## 2019-10-04 PROCEDURE — 63600175 PHARM REV CODE 636 W HCPCS: Performed by: NURSE ANESTHETIST, CERTIFIED REGISTERED

## 2019-10-04 PROCEDURE — 25000003 PHARM REV CODE 250: Performed by: NURSE ANESTHETIST, CERTIFIED REGISTERED

## 2019-10-04 RX ORDER — OXYCODONE HYDROCHLORIDE 5 MG/1
5 TABLET ORAL
Status: DISCONTINUED | OUTPATIENT
Start: 2019-10-04 | End: 2019-10-04 | Stop reason: HOSPADM

## 2019-10-04 RX ORDER — MEPERIDINE HYDROCHLORIDE 25 MG/ML
12.5 INJECTION INTRAMUSCULAR; INTRAVENOUS; SUBCUTANEOUS ONCE AS NEEDED
Status: DISCONTINUED | OUTPATIENT
Start: 2019-10-04 | End: 2019-10-04 | Stop reason: HOSPADM

## 2019-10-04 RX ORDER — HYDROCODONE BITARTRATE AND ACETAMINOPHEN 5; 325 MG/1; MG/1
1 TABLET ORAL EVERY 4 HOURS PRN
Status: DISCONTINUED | OUTPATIENT
Start: 2019-10-04 | End: 2019-10-04 | Stop reason: HOSPADM

## 2019-10-04 RX ORDER — HYDROMORPHONE HYDROCHLORIDE 2 MG/ML
0.4 INJECTION, SOLUTION INTRAMUSCULAR; INTRAVENOUS; SUBCUTANEOUS EVERY 5 MIN PRN
Status: DISCONTINUED | OUTPATIENT
Start: 2019-10-04 | End: 2019-10-04 | Stop reason: HOSPADM

## 2019-10-04 RX ORDER — SODIUM CHLORIDE, SODIUM LACTATE, POTASSIUM CHLORIDE, CALCIUM CHLORIDE 600; 310; 30; 20 MG/100ML; MG/100ML; MG/100ML; MG/100ML
INJECTION, SOLUTION INTRAVENOUS CONTINUOUS PRN
Status: DISCONTINUED | OUTPATIENT
Start: 2019-10-04 | End: 2019-10-04

## 2019-10-04 RX ORDER — FENTANYL CITRATE 50 UG/ML
INJECTION, SOLUTION INTRAMUSCULAR; INTRAVENOUS
Status: DISCONTINUED | OUTPATIENT
Start: 2019-10-04 | End: 2019-10-04

## 2019-10-04 RX ORDER — CEFAZOLIN SODIUM 1 G/3ML
2 INJECTION, POWDER, FOR SOLUTION INTRAMUSCULAR; INTRAVENOUS
Status: COMPLETED | OUTPATIENT
Start: 2019-10-04 | End: 2019-10-04

## 2019-10-04 RX ORDER — SODIUM CHLORIDE 0.9 % (FLUSH) 0.9 %
3 SYRINGE (ML) INJECTION
Status: DISCONTINUED | OUTPATIENT
Start: 2019-10-04 | End: 2019-10-04 | Stop reason: HOSPADM

## 2019-10-04 RX ORDER — LIDOCAINE HCL/PF 100 MG/5ML
SYRINGE (ML) INTRAVENOUS
Status: DISCONTINUED | OUTPATIENT
Start: 2019-10-04 | End: 2019-10-04

## 2019-10-04 RX ORDER — SODIUM CHLORIDE 9 MG/ML
INJECTION, SOLUTION INTRAVENOUS CONTINUOUS
Status: DISCONTINUED | OUTPATIENT
Start: 2019-10-04 | End: 2019-10-04 | Stop reason: HOSPADM

## 2019-10-04 RX ORDER — OXYCODONE HYDROCHLORIDE 5 MG/1
5 TABLET ORAL
Status: DISCONTINUED | OUTPATIENT
Start: 2019-10-04 | End: 2019-10-04 | Stop reason: SDUPTHER

## 2019-10-04 RX ORDER — ROPIVACAINE HYDROCHLORIDE 5 MG/ML
INJECTION, SOLUTION EPIDURAL; INFILTRATION; PERINEURAL
Status: DISCONTINUED | OUTPATIENT
Start: 2019-10-04 | End: 2019-10-04 | Stop reason: HOSPADM

## 2019-10-04 RX ORDER — GLYCOPYRROLATE 0.2 MG/ML
INJECTION INTRAMUSCULAR; INTRAVENOUS
Status: DISCONTINUED | OUTPATIENT
Start: 2019-10-04 | End: 2019-10-04

## 2019-10-04 RX ORDER — HYDROCODONE BITARTRATE AND ACETAMINOPHEN 10; 325 MG/1; MG/1
1 TABLET ORAL EVERY 4 HOURS PRN
Status: DISCONTINUED | OUTPATIENT
Start: 2019-10-04 | End: 2019-10-04 | Stop reason: HOSPADM

## 2019-10-04 RX ORDER — HYDROCODONE BITARTRATE AND ACETAMINOPHEN 5; 325 MG/1; MG/1
1 TABLET ORAL EVERY 6 HOURS PRN
Qty: 20 TABLET | Refills: 0 | Status: SHIPPED | OUTPATIENT
Start: 2019-10-04 | End: 2020-02-03

## 2019-10-04 RX ORDER — MIDAZOLAM HYDROCHLORIDE 1 MG/ML
INJECTION, SOLUTION INTRAMUSCULAR; INTRAVENOUS
Status: DISCONTINUED | OUTPATIENT
Start: 2019-10-04 | End: 2019-10-04

## 2019-10-04 RX ORDER — ONDANSETRON 2 MG/ML
4 INJECTION INTRAMUSCULAR; INTRAVENOUS DAILY PRN
Status: DISCONTINUED | OUTPATIENT
Start: 2019-10-04 | End: 2019-10-04 | Stop reason: HOSPADM

## 2019-10-04 RX ORDER — DEXAMETHASONE SODIUM PHOSPHATE 4 MG/ML
INJECTION, SOLUTION INTRA-ARTICULAR; INTRALESIONAL; INTRAMUSCULAR; INTRAVENOUS; SOFT TISSUE
Status: DISCONTINUED | OUTPATIENT
Start: 2019-10-04 | End: 2019-10-04

## 2019-10-04 RX ORDER — ONDANSETRON HYDROCHLORIDE 2 MG/ML
INJECTION, SOLUTION INTRAMUSCULAR; INTRAVENOUS
Status: DISCONTINUED | OUTPATIENT
Start: 2019-10-04 | End: 2019-10-04

## 2019-10-04 RX ORDER — SODIUM CHLORIDE 0.9 % (FLUSH) 0.9 %
5 SYRINGE (ML) INJECTION
Status: DISCONTINUED | OUTPATIENT
Start: 2019-10-04 | End: 2019-10-04 | Stop reason: HOSPADM

## 2019-10-04 RX ORDER — PROPOFOL 10 MG/ML
VIAL (ML) INTRAVENOUS
Status: DISCONTINUED | OUTPATIENT
Start: 2019-10-04 | End: 2019-10-04

## 2019-10-04 RX ADMIN — PROPOFOL 200 MG: 10 INJECTION, EMULSION INTRAVENOUS at 06:10

## 2019-10-04 RX ADMIN — DEXAMETHASONE SODIUM PHOSPHATE 8 MG: 4 INJECTION, SOLUTION INTRAMUSCULAR; INTRAVENOUS at 07:10

## 2019-10-04 RX ADMIN — PROPOFOL 100 MG: 10 INJECTION, EMULSION INTRAVENOUS at 06:10

## 2019-10-04 RX ADMIN — CARBOXYMETHYLCELLULOSE SODIUM 2 DROP: 2.5 SOLUTION/ DROPS OPHTHALMIC at 06:10

## 2019-10-04 RX ADMIN — ONDANSETRON 4 MG: 2 INJECTION, SOLUTION INTRAMUSCULAR; INTRAVENOUS at 07:10

## 2019-10-04 RX ADMIN — CEFAZOLIN 2 G: 330 INJECTION, POWDER, FOR SOLUTION INTRAMUSCULAR; INTRAVENOUS at 07:10

## 2019-10-04 RX ADMIN — MIDAZOLAM 2 MG: 1 INJECTION INTRAMUSCULAR; INTRAVENOUS at 07:10

## 2019-10-04 RX ADMIN — GLYCOPYRROLATE 0.2 MG: 0.2 INJECTION, SOLUTION INTRAMUSCULAR; INTRAVENOUS at 07:10

## 2019-10-04 RX ADMIN — LIDOCAINE HYDROCHLORIDE 100 MG: 20 INJECTION, SOLUTION INTRAVENOUS at 06:10

## 2019-10-04 RX ADMIN — SODIUM CHLORIDE, SODIUM LACTATE, POTASSIUM CHLORIDE, AND CALCIUM CHLORIDE: 600; 310; 30; 20 INJECTION, SOLUTION INTRAVENOUS at 06:10

## 2019-10-04 RX ADMIN — FENTANYL CITRATE 100 MCG: 50 INJECTION, SOLUTION INTRAMUSCULAR; INTRAVENOUS at 06:10

## 2019-10-04 NOTE — PLAN OF CARE
Michael Rivero has met all discharge criteria from Phase II. Vital Signs are stable, ambulating  without difficulty. Discharge instructions given, patient verbalized understanding. Discharged from facility via wheelchair in stable condition.

## 2019-10-04 NOTE — TRANSFER OF CARE
Anesthesia Transfer of Care Note    Patient: Michael Rivero    Procedure(s) Performed: Procedure(s) (LRB):  ARTHROSCOPY, KNEE, WITH MENISCECTOMY (Right)    Patient location: PACU    Anesthesia Type: general    Transport from OR: Transported from OR on 2-3 L/min O2 by NC with adequate spontaneous ventilation    Post pain: adequate analgesia    Post assessment: no apparent anesthetic complications    Post vital signs: stable    Level of consciousness: awake, alert and oriented    Nausea/Vomiting: no nausea/vomiting    Complications: none    Transfer of care protocol was followed      Last vitals:   Visit Vitals  BP (!) 142/85 (BP Location: Right arm, Patient Position: Sitting)   Pulse (!) 53   Temp 36.7 °C (98.1 °F) (Oral)   Resp 16   Ht 6' (1.829 m)   Wt 95.3 kg (210 lb)   SpO2 95%   BMI 28.48 kg/m²

## 2019-10-04 NOTE — OR NURSING
Pt wihtout change from previous assessment. Continues without c/o pain at this time. Prepared for transfer to acu.

## 2019-10-04 NOTE — OP NOTE
DATE OF PROCEDURE: 10/04/2019     CHIEF COMPLAINT AND PRESENT ILLNESS:   pain popping swelling right knee exam MRI consistent with medial meniscal tear admitted for arthroscopy significant risks of progressive arthritis discussed     PREOPERATIVE DIAGNOSIS:  Right knee medial meniscal tear     POSTOPERATIVE DIAGNOSIS:   same     PROCEDURES PERFORMED:   right knee arthroscopy partial medial meniscectomy    SURGEON:  Isac Hightower M.D.     ASSISTANT:  Laura Baker CST.     COMPLICATIONS:   none     ANESTHESIA:  General     BLOOD LOSS:   12     IMPLANTS:  None     PROCEDURE IN DETAIL:   patient brought the operating room under general anesthesia without difficulty.  He had a small area prepatellar bursitis that had resolved just some mild skin discoloration.  He was prepped and draped in usual fashion tourniquet applied 250 mm mercury after Esmarch.  Using standard anterior arthroscopic portals examination as follows suprapatellar pouch clear patellofemoral joint clear mild chondromalacia changes nothing need to be addressed medial gutter clear medial joint line complex posterior half medial meniscal tear.  With the baskets and shaver the loose edges and debris were removed.  He did have a little chondrocalcinosis there consistent with his gout.  He did have some chondromalacia changes on the femoral condyle on the tibial plateau he actually looked good the notch was clear anterior cruciate ligament clear lateral joint line clear no significant chondromalacia lateral meniscus good tumor looked from the joint performed thorough lavage performed 0.5% ropivacaine was instilled the soft tissues placed in bulky sterile dressing tourniquet released at 12 min tolerated procedure well brought to come sterile fashion    This was performed with a poor recognition system

## 2019-10-04 NOTE — DISCHARGE INSTRUCTIONS
Anesthesia: After Your Surgery  Youve just had surgery. During surgery, you received medication called anesthesia to keep you comfortable and pain-free. After surgery, you may experience some pain or nausea. This is common. Here are some tips for feeling better and recovering after surgery.    Going home  Your doctor or nurse will show you how to take care of yourself when you go home. He or she will also answer your questions. Have an adult family member or friend drive you home. For the first 24 hours after your surgery:  · Do not drive or use heavy equipment.  · Do not make important decisions or sign legal documents.  · Avoid alcohol.  · Have someone stay with you, if needed. He or she can watch for problems and help keep you safe.  · Take your time getting up from a seated or lying position. You may experience dizziness for 24 hours  Be sure to keep all follow-up appointments with your doctor. And rest after your procedure for as long as your doctor tells you to.    Coping with pain  If you have pain after surgery, pain medication will help you feel better. Take it as directed, before pain becomes severe. Also, ask your doctor or pharmacist about other ways to control pain, such as with heat, ice, and relaxation. And follow any other instructions your surgeon or nurse gives you.    URINARY RETENTION  Should you experience a decrease in your urine output or are unable to urinate following surgery, this can be due to the medications given during surgery.  We recommend you going to the nearest Emergency Department.    Tips for taking pain medication  To get the best relief possible, remember these points:  · Pain medications can upset your stomach. Taking them with a little food may help.  · Most pain relievers taken by mouth need at least 20 to 30 minutes to take effect.  · Taking medication on a schedule can help you remember to take it. Try to time your medication so that you can take it before beginning an  activity, such as dressing, walking, or sitting down for dinner.  · Constipation is a common side effect of pain medications. Contact your doctor before taking any medications like laxatives or stool softeners to help relieve constipation. Also ask about any dietary restrictions, because drinking lots of fluids and eating foods like fruits and vegetables that are high in fiber can also help. Remember, dont take laxatives unless your surgeon has prescribed them.  · Mixing alcohol and pain medication can cause dizziness and slow your breathing. It can even be fatal. Dont drink alcohol while taking pain medication.  · Pain medication can slow your reflexes. Dont drive or operate machinery while taking pain medication.  If your health care provider tells you to take acetaminophen to help relieve your pain, ask him or her how much you are supposed to take each day. (Acetaminophen is the generic name for Tylenol and other brand-name pain relievers.) Acetaminophen or other pain relievers may interact with your prescription medicines or other over-the-counter (OTC) drugs. Some prescription medications contain acetaminophen along with other active ingredients. Using both prescription and OTC acetaminophen for pain can cause you to overdose. The FDA recommends that you read the labels on your OTC medications carefully. This will help you to clearly understand the list of active ingredients, dosing instructions, and any warnings. It may also help you avoid taking too much acetaminophen. If you have questions or don't understand the information, ask your pharmacist or health care provider to explain it to you before you take the OTC medication.    Managing nausea  Some people have an upset stomach after surgery. This is often due to anesthesia, pain, pain medications, or the stress of surgery. The following tips will help you manage nausea and get good nutrition as you recover. If you were on a special diet before surgery,  ask your doctor if you should follow it during recovery. These tips may help:  · Dont push yourself to eat. Your body will tell you when to eat and how much.  · Start off with clear liquids and soup. They are easier to digest.  · Progress to semi-solid foods (mashed potatoes, applesauce, and gelatin) as you feel ready.  · Slowly move to solid foods. Dont eat fatty, rich, or spicy foods at first.  · Dont force yourself to have three large meals a day. Instead, eat smaller amounts more often.  · Take pain medications with a small amount of solid food, such as crackers or toast to avoid nausea.      Call your surgeon if    · You feel too sleepy, dizzy, or groggy (medication may be too strong).  · You have side effects like nausea, vomiting, or skin changes (rash, itching, or hives).   © 8264-9697 [employer] [address] [place] [address], [place] [address]. All rights reserved. This information is not intended as a substitute for professional medical care. Always follow your healthcare professional's instructions.

## 2019-10-04 NOTE — ANESTHESIA POSTPROCEDURE EVALUATION
Anesthesia Post Evaluation    Patient: Michael Rivero    Procedure(s) Performed: Procedure(s) (LRB):  ARTHROSCOPY, KNEE, WITH MENISCECTOMY (Right)    Final Anesthesia Type: general  Patient location during evaluation: PACU  Patient participation: Yes- Able to Participate  Level of consciousness: awake and alert  Post-procedure vital signs: reviewed and stable  Pain management: adequate  Airway patency: patent  PONV status at discharge: No PONV  Anesthetic complications: no      Cardiovascular status: blood pressure returned to baseline  Respiratory status: unassisted  Hydration status: euvolemic  Follow-up not needed.          Vitals Value Taken Time   /89 10/4/2019  8:40 AM   Temp 36.6 °C (97.8 °F) 10/4/2019  8:10 AM   Pulse 69 10/4/2019  8:40 AM   Resp 18 10/4/2019  8:40 AM   SpO2 99 % 10/4/2019  8:40 AM         Event Time     Out of Recovery 08:02:29          Pain/Carlito Score: Carlito Score: 10 (10/4/2019  8:40 AM)

## 2019-10-04 NOTE — BRIEF OP NOTE
[facility]  Brief Operative Note     SUMMARY     Surgery Date: 10/4/2019     Surgeon(s) and Role:     * Isac Hightower MD - Primary    Assisting Surgeon: None    Pre-op Diagnosis:  Acute medial meniscus tear of right knee, initial encounter [S83.241A]    Post-op Diagnosis:  Post-Op Diagnosis Codes:     * Acute medial meniscus tear of right knee, initial encounter [S83.241A]    Procedure(s) (LRB):  ARTHROSCOPY, KNEE, WITH MENISCECTOMY (Right)    Anesthesia: General    Description of the findings of the procedure:  Right knee medial meniscal tear partial medial meniscectomy    Findings/Key Components:  Right knee medial meniscal tear    Estimated Blood Loss: * No values recorded between 10/4/2019  7:05 AM and 10/4/2019  7:22 AM * 12         Specimens:   Specimen (12h ago, onward)    None          Discharge Note    SUMMARY     Admit Date: 10/4/2019    Discharge Date and Time: No discharge date for patient encounter.    Hospital Course (synopsis of major diagnoses, care, treatment, and services provided during the course of the hospital stay): The above patient underwent the above outpatient procedure. The patient tolerated procedure well and will be discharged today.--see orders.       Final Diagnosis: Post-Op Diagnosis Codes:     * Acute medial meniscus tear of right knee, initial encounter [S83.241A]    Disposition: Home or Self Care    Follow Up/Patient Instructions:     Medications:  Reconciled Home Medications:      Medication List      START taking these medications    HYDROcodone-acetaminophen 5-325 mg per tablet  Commonly known as:  NORCO  Take 1 tablet by mouth every 6 (six) hours as needed for Pain.        CONTINUE taking these medications    allopurinol 100 MG tablet  Commonly known as:  ZYLOPRIM  Take 1 tablet (100 mg total) by mouth once daily.     magnesium oxide 400 mg (241.3 mg magnesium) tablet  Commonly known as:  MAG-OX  Take 1 tablet by mouth 2 (two) times daily.      metoprolol succinate 50 MG 24 hr tablet  Commonly known as:  TOPROL-XL  TAKE 2 TABLETS (100 MG TOTAL) BY MOUTH ONCE DAILY.     olmesartan 20 MG tablet  Commonly known as:  BENICAR  Take 20 mg by mouth once daily.          Discharge Procedure Orders   CRUTCHES FOR HOME USE     Order Specific Question Answer Comments   Type: Axillary    Height: 6' (1.829 m)    Weight: 95.3 kg (210 lb)    Length of need (1-99 months): 99      Diet general     Passive ROM, SLR, Quad sets     Activity as tolerated     Sponge bath only until clinic visit     Weight bearing restrictions (specify)   Order Comments: Weight Bearing as tolerated using crutches as needed.     No driving, operating heavy equipment or signing legal documents while taking pain medication     Call MD for:  temperature >100.4     Call MD for:  persistent nausea and vomiting     Call MD for:  severe uncontrolled pain     Call MD for:  difficulty breathing, headache or visual disturbances     Call MD for:  redness, tenderness, or signs of infection (pain, swelling, redness, odor or green/yellow discharge around incision site)     Leave dressing on - Keep it clean, dry, and intact until clinic visit     Follow-up Information     Please follow up.    Why:  AS SCHEDULED , Call [phone] to reach Dr. Hightower

## 2019-10-04 NOTE — INTERVAL H&P NOTE
The patient has been examined and the H&P has been reviewed:    I concur with the findings and no changes have occurred since H&P was written.    Anesthesia/Surgery risks, benefits and alternative options discussed and understood by patient/family.          Active Hospital Problems    Diagnosis  POA    *MMT (medial meniscus tear) [S87.179A]  Yes      Resolved Hospital Problems   No resolved problems to display.

## 2020-05-08 NOTE — OR NURSING
Calling Daughter- daughter stated:     My mom is super confused and the hospital is not communicating with me and I was hoping to talk to Dr. Daugherty. I am unable to get any information for the hospital doctors and I want to talk to a doctor regarding my mother's  Care. Sympathized with daughter and   Adv patient to call nursing unit and have nurses to page MD and have them call her back to discuss mothers care.     Daughter stated that the hospital wants to put my mom in a TCU and no one has talked to me about this. Encouraged daughter to talk to care team in the hospital. Daughter stated she put in call to the nurses station but would also like me to forward her questions on to Dr. Daugherty. Informed pt Dr. Daugherty is not working today but will be in on Monday.     Jenn Velarde RN on 5/8/2020 at 11:32 AM     Dr. Hightower notified of healing scab to pt's R knee. Verbalized understanding. Send to holding.

## 2020-09-11 ENCOUNTER — OFFICE VISIT (OUTPATIENT)
Dept: PRIMARY CARE CLINIC | Facility: CLINIC | Age: 65
End: 2020-09-11
Payer: COMMERCIAL

## 2020-09-11 VITALS
HEIGHT: 72 IN | BODY MASS INDEX: 30.77 KG/M2 | HEART RATE: 54 BPM | OXYGEN SATURATION: 97 % | RESPIRATION RATE: 17 BRPM | WEIGHT: 227.19 LBS | DIASTOLIC BLOOD PRESSURE: 80 MMHG | SYSTOLIC BLOOD PRESSURE: 136 MMHG | TEMPERATURE: 98 F

## 2020-09-11 DIAGNOSIS — I10 ESSENTIAL HYPERTENSION: ICD-10-CM

## 2020-09-11 DIAGNOSIS — M10.9 GOUT SYNOVITIS: Primary | ICD-10-CM

## 2020-09-11 DIAGNOSIS — I49.3 SYMPTOMATIC PVCS: ICD-10-CM

## 2020-09-11 DIAGNOSIS — K21.9 GASTROESOPHAGEAL REFLUX DISEASE WITHOUT ESOPHAGITIS: ICD-10-CM

## 2020-09-11 DIAGNOSIS — Z12.11 SCREEN FOR COLON CANCER: ICD-10-CM

## 2020-09-11 PROCEDURE — 99214 OFFICE O/P EST MOD 30 MIN: CPT | Mod: S$GLB,,, | Performed by: STUDENT IN AN ORGANIZED HEALTH CARE EDUCATION/TRAINING PROGRAM

## 2020-09-11 PROCEDURE — 1101F PR PT FALLS ASSESS DOC 0-1 FALLS W/OUT INJ PAST YR: ICD-10-PCS | Mod: CPTII,S$GLB,, | Performed by: STUDENT IN AN ORGANIZED HEALTH CARE EDUCATION/TRAINING PROGRAM

## 2020-09-11 PROCEDURE — 1101F PT FALLS ASSESS-DOCD LE1/YR: CPT | Mod: CPTII,S$GLB,, | Performed by: STUDENT IN AN ORGANIZED HEALTH CARE EDUCATION/TRAINING PROGRAM

## 2020-09-11 PROCEDURE — 3008F PR BODY MASS INDEX (BMI) DOCUMENTED: ICD-10-PCS | Mod: CPTII,S$GLB,, | Performed by: STUDENT IN AN ORGANIZED HEALTH CARE EDUCATION/TRAINING PROGRAM

## 2020-09-11 PROCEDURE — 99214 PR OFFICE/OUTPT VISIT, EST, LEVL IV, 30-39 MIN: ICD-10-PCS | Mod: S$GLB,,, | Performed by: STUDENT IN AN ORGANIZED HEALTH CARE EDUCATION/TRAINING PROGRAM

## 2020-09-11 PROCEDURE — 3008F BODY MASS INDEX DOCD: CPT | Mod: CPTII,S$GLB,, | Performed by: STUDENT IN AN ORGANIZED HEALTH CARE EDUCATION/TRAINING PROGRAM

## 2020-09-11 PROCEDURE — 99999 PR PBB SHADOW E&M-EST. PATIENT-LVL IV: CPT | Mod: PBBFAC,,, | Performed by: STUDENT IN AN ORGANIZED HEALTH CARE EDUCATION/TRAINING PROGRAM

## 2020-09-11 PROCEDURE — 3075F SYST BP GE 130 - 139MM HG: CPT | Mod: CPTII,S$GLB,, | Performed by: STUDENT IN AN ORGANIZED HEALTH CARE EDUCATION/TRAINING PROGRAM

## 2020-09-11 PROCEDURE — 99999 PR PBB SHADOW E&M-EST. PATIENT-LVL IV: ICD-10-PCS | Mod: PBBFAC,,, | Performed by: STUDENT IN AN ORGANIZED HEALTH CARE EDUCATION/TRAINING PROGRAM

## 2020-09-11 PROCEDURE — 3075F PR MOST RECENT SYSTOLIC BLOOD PRESS GE 130-139MM HG: ICD-10-PCS | Mod: CPTII,S$GLB,, | Performed by: STUDENT IN AN ORGANIZED HEALTH CARE EDUCATION/TRAINING PROGRAM

## 2020-09-11 PROCEDURE — 3079F DIAST BP 80-89 MM HG: CPT | Mod: CPTII,S$GLB,, | Performed by: STUDENT IN AN ORGANIZED HEALTH CARE EDUCATION/TRAINING PROGRAM

## 2020-09-11 PROCEDURE — 3079F PR MOST RECENT DIASTOLIC BLOOD PRESSURE 80-89 MM HG: ICD-10-PCS | Mod: CPTII,S$GLB,, | Performed by: STUDENT IN AN ORGANIZED HEALTH CARE EDUCATION/TRAINING PROGRAM

## 2020-09-11 RX ORDER — PANTOPRAZOLE SODIUM 40 MG/1
40 TABLET, DELAYED RELEASE ORAL DAILY
Qty: 90 TABLET | Refills: 1 | Status: SHIPPED | OUTPATIENT
Start: 2020-09-11 | End: 2021-03-09

## 2020-09-11 RX ORDER — ALLOPURINOL 300 MG/1
300 TABLET ORAL DAILY
Qty: 90 TABLET | Refills: 1 | Status: SHIPPED | OUTPATIENT
Start: 2020-09-11 | End: 2021-03-09

## 2020-09-11 NOTE — PATIENT INSTRUCTIONS
"  Thanks for coming in.  We can check back in in 6-12 months to see how you are doing.  I'll be sending 12 months of your meds to the [facility] pharmacy.     ===========    GERD Education:    Try to decrease the  triggers of heartburn which include - alcohol, tobacco, caffeine, spicy foods, and fatty foods.  Avoid eating large meals before lying down and try to remain upright for 30 minutes after eating any foods.     Also taking an antiinflammatory ( like Aspirin, Advil (ibuprofen), Alleve (naproxen), Mobic, Aspirin 325mg )  daily can worsen Heartburn or Reflux (GERD)    You can try nonprescription supplements over the counter to see if they help - Papaya enzyme or Aloe Vera concentrate    Consider stopping DAIRY (milk, yogurt, cheese) for a month to see if this is causing your symptoms.     ===========================    There have been some concerns taking these PPI (proton pump inhibitor) medications (Prilosec, Nexium, Protonix) daily for years on end.     Every now & then , see if you can take a "holiday" from the PPI medications & try the less strong over the counter antacid medications like Pepcid or TUMS.     "

## 2020-09-11 NOTE — PROGRESS NOTES
Subjective:       Patient ID: Michael Rivero is a 65 y.o. male.    Chief Complaint: Establish Care      Mr. Rivero states he is here to interview for a possible new PCP.       Reports he is doing well currently.  Has chronic issues with gout, PVCs with palpitations, bradycardia and a history of knee surgery.      His PVCs and bradycardia are being treated by Dr. Webber.  He takes Metoprolol nightly with some positive effect.  His resting HR is usually 50-60s and on BBlocker it can get lower but he has not had syncopal or presyncopal events. No chest pain.  No visual change or headaches.  No LE swelling.    His gout typically presents in he toes bilaterally or the arch of his right foot.  He has had a flare in the left thumb before.  Takes Allopurinol daily and uses Colchicine PRN.  No current joint pains, inflammation.  His previous medical plan has worked well and he would like to keep that plan in place.    Also sees urology and needed.  Gets PSAs through work, which has not been concerning.  Last colonoscopy was 10 years ago and was completely normal.  He was told that he would need to get another scope in 10 years so he expects he is due.    Review of Systems   Constitutional: Negative for activity change and unexpected weight change.   HENT: Negative for hearing loss, rhinorrhea and trouble swallowing.    Eyes: Negative for discharge and visual disturbance.   Respiratory: Negative for chest tightness and wheezing.    Cardiovascular: Negative for chest pain and palpitations.   Gastrointestinal: Negative for blood in stool, constipation, diarrhea and vomiting.   Endocrine: Negative for polydipsia and polyuria.   Genitourinary: Negative for difficulty urinating, hematuria and urgency.   Musculoskeletal: Negative for arthralgias, joint swelling and neck pain.   Neurological: Negative for weakness and headaches.   Psychiatric/Behavioral: Negative for confusion and dysphoric mood.       Objective:      Vitals:     09/11/20 1104   BP: 136/80   BP Location: Left arm   Patient Position: Sitting   BP Method: Medium (Manual)   Pulse: (!) 54   Resp: 17   Temp: 98.1 °F (36.7 °C)   TempSrc: Oral   SpO2: 97%   Weight: 103 kg (227 lb 2.9 oz)   Height: 6' (1.829 m)     Physical Exam  Constitutional:       Appearance: Normal appearance. He is obese. He is not toxic-appearing.   HENT:      Head: Normocephalic and atraumatic.      Right Ear: Tympanic membrane and ear canal normal.      Left Ear: Tympanic membrane and ear canal normal.      Nose: No congestion.      Mouth/Throat:      Mouth: Mucous membranes are moist.      Pharynx: Oropharynx is clear.   Eyes:      Extraocular Movements: Extraocular movements intact.      Pupils: Pupils are equal, round, and reactive to light.   Neck:      Musculoskeletal: Normal range of motion.   Cardiovascular:      Rate and Rhythm: Normal rate and regular rhythm.      Heart sounds: No murmur.   Pulmonary:      Effort: Pulmonary effort is normal. No respiratory distress.      Breath sounds: No wheezing.   Abdominal:      General: Bowel sounds are normal.      Palpations: Abdomen is soft.   Musculoskeletal:         General: No swelling.   Skin:     General: Skin is warm.      Capillary Refill: Capillary refill takes less than 2 seconds.      Coloration: Skin is not jaundiced.   Neurological:      General: No focal deficit present.      Mental Status: He is alert and oriented to person, place, and time.      Motor: No weakness.   Psychiatric:         Mood and Affect: Mood normal.             Lab Results   Component Value Date     02/03/2020    K 4.1 02/03/2020     02/03/2020    CO2 30 02/03/2020    BUN 12 02/03/2020    CREATININE 1.03 02/03/2020    ANIONGAP 9 01/04/2018     No results found for: HGBA1C  No results found for: BNP, BNPTRIAGEBLO    Lab Results   Component Value Date    WBC 6.3 02/03/2020    HGB 14.0 02/03/2020    HCT 40.5 02/03/2020     02/03/2020    GRAN 7.6 01/04/2018     GRAN 79.4 (H) 01/04/2018     Lab Results   Component Value Date    CHOL 165 02/03/2020    HDL 37 (L) 02/03/2020    LDLCALC 104 (H) 02/03/2020    TRIG 140 02/03/2020          Current Outpatient Medications:     allopurinoL (ZYLOPRIM) 300 MG tablet, TAKE 1 TABLET BY MOUTH EVERY DAY, Disp: 90 tablet, Rfl: 0    COLCRYS 0.6 mg tablet, Take 0.6 mg by mouth once daily. An needed for gout, Disp: , Rfl:     metoprolol succinate (TOPROL-XL) 50 MG 24 hr tablet, Take 2 tablets (100 mg total) by mouth once daily. (Patient taking differently: Take 50 mg by mouth 2 (two) times daily. ), Disp: 180 tablet, Rfl: 0    olmesartan (BENICAR) 20 MG tablet, Take 1 tablet (20 mg total) by mouth once daily., Disp: 90 tablet, Rfl: 0    pantoprazole (PROTONIX) 40 MG tablet, TAKE 1 TABLET BY MOUTH EVERY DAY, Disp: 90 tablet, Rfl: 0        Assessment:       1. Gout synovitis    2. Gastroesophageal reflux disease without esophagitis    3. Essential hypertension    4. Symptomatic PVCs    5. Screen for colon cancer           Plan:       Gout synovitis  -     allopurinoL (ZYLOPRIM) 300 MG tablet; Take 1 tablet (300 mg total) by mouth once daily.  Dispense: 90 tablet; Refill: 1   - discussed treatment and progression of disease.     - some discussion on prevention such as dietary choices.   - Advised can f/u on this issue in 6-12 months.      Gastroesophageal reflux disease without esophagitis  -     pantoprazole (PROTONIX) 40 MG tablet; Take 1 tablet (40 mg total) by mouth once daily.  Dispense: 90 tablet; Refill: 1   - discussed chronic use of Pantoprazole.  Patient informed decision making used to determine daily use of PPI.  Patient voices understanding and says he will cut back on use some though it does help his acid reflex at times.  Rx was dispensed but was also advised to limit/avoid caffeine, carbonation, EtOH, smoking and spices.  Also advised there would be benefit from weight loss.     - discussed some OTC options as well.   - will  f/u in 6-12 months to discuss how he is doing and if we able to cut back on meds.    Essential hypertension   - BP controlled today, 136/80.  Works with Dr. Webber to address his heart issues.    - No complaints today, no changes were made to plan of care.    Symptomatic PVCs   - improved with Metoprolol, working with Dr. Webber for this issue.  No s/s other than bradycardia.  No changes made.      Screen for colon cancer   - patient has referral in for GI to screen for colon caner, last scope was 10 years ago and was wnl.     Patient has questions on what insurance he should get.  He was advised to speak to an  as I do not have the ability to furnish such recommendations.

## 2021-04-02 DIAGNOSIS — M10.9 GOUT SYNOVITIS: ICD-10-CM

## 2021-04-02 DIAGNOSIS — K21.9 GASTROESOPHAGEAL REFLUX DISEASE WITHOUT ESOPHAGITIS: ICD-10-CM

## 2021-04-05 RX ORDER — ALLOPURINOL 300 MG/1
TABLET ORAL
Qty: 30 TABLET | Refills: 1 | OUTPATIENT
Start: 2021-04-05

## 2021-04-07 RX ORDER — ALLOPURINOL 300 MG/1
300 TABLET ORAL DAILY
Qty: 90 TABLET | Refills: 3 | Status: SHIPPED | OUTPATIENT
Start: 2021-04-07 | End: 2022-07-29

## 2021-06-11 ENCOUNTER — OFFICE VISIT (OUTPATIENT)
Dept: PRIMARY CARE CLINIC | Facility: CLINIC | Age: 66
End: 2021-06-11
Payer: COMMERCIAL

## 2021-06-11 VITALS
RESPIRATION RATE: 16 BRPM | HEART RATE: 53 BPM | SYSTOLIC BLOOD PRESSURE: 128 MMHG | WEIGHT: 220.69 LBS | HEIGHT: 72 IN | BODY MASS INDEX: 29.89 KG/M2 | DIASTOLIC BLOOD PRESSURE: 78 MMHG | OXYGEN SATURATION: 97 %

## 2021-06-11 DIAGNOSIS — E78.2 MIXED HYPERLIPIDEMIA: Primary | ICD-10-CM

## 2021-06-11 DIAGNOSIS — I10 ESSENTIAL HYPERTENSION: Chronic | ICD-10-CM

## 2021-06-11 PROCEDURE — 99999 PR PBB SHADOW E&M-EST. PATIENT-LVL IV: ICD-10-PCS | Mod: PBBFAC,,, | Performed by: STUDENT IN AN ORGANIZED HEALTH CARE EDUCATION/TRAINING PROGRAM

## 2021-06-11 PROCEDURE — 99213 OFFICE O/P EST LOW 20 MIN: CPT | Mod: S$GLB,,, | Performed by: STUDENT IN AN ORGANIZED HEALTH CARE EDUCATION/TRAINING PROGRAM

## 2021-06-11 PROCEDURE — 99213 PR OFFICE/OUTPT VISIT, EST, LEVL III, 20-29 MIN: ICD-10-PCS | Mod: S$GLB,,, | Performed by: STUDENT IN AN ORGANIZED HEALTH CARE EDUCATION/TRAINING PROGRAM

## 2021-06-11 PROCEDURE — 99214 OFFICE O/P EST MOD 30 MIN: CPT | Mod: PBBFAC,PN | Performed by: STUDENT IN AN ORGANIZED HEALTH CARE EDUCATION/TRAINING PROGRAM

## 2021-06-11 PROCEDURE — 99999 PR PBB SHADOW E&M-EST. PATIENT-LVL IV: CPT | Mod: PBBFAC,,, | Performed by: STUDENT IN AN ORGANIZED HEALTH CARE EDUCATION/TRAINING PROGRAM

## 2021-06-11 RX ORDER — OLMESARTAN MEDOXOMIL 20 MG/1
10 TABLET ORAL DAILY
COMMUNITY
End: 2023-12-18

## 2021-06-11 RX ORDER — METOPROLOL SUCCINATE 50 MG/1
50 TABLET, EXTENDED RELEASE ORAL DAILY
COMMUNITY

## 2021-06-11 RX ORDER — ROSUVASTATIN CALCIUM 5 MG/1
5 TABLET, COATED ORAL DAILY
COMMUNITY
Start: 2021-01-21 | End: 2022-07-25

## 2022-01-06 ENCOUNTER — TELEPHONE (OUTPATIENT)
Dept: PRIMARY CARE CLINIC | Facility: CLINIC | Age: 67
End: 2022-01-06
Payer: COMMERCIAL

## 2022-01-06 NOTE — TELEPHONE ENCOUNTER
Patient states he tested positive for covid and wants to know if he should do anything special.   Per Dr. Li advised patient to use OTC Mucinex, tylenol and Robitussin.  Also informed him if he has trouble breathing he should go to the ED.    He states its just congestion with a cough.

## 2022-01-06 NOTE — TELEPHONE ENCOUNTER
----- Message from Brigette Ramesh sent at 1/6/2022 10:36 AM CST -----  Contact: Pt [phone]  Patient would like to get medical advice.  Symptoms (please be specific):  Covid Positive  How long have you had these symptoms: advice  Would you like a call back, or a response through your [facility] portal?:  Call back  Pharmacy name and phone # (copy from chart):    [id number] - [place] - [address]  [address] [place] [address]  Phone: [phone] Fax: [phone]    Comments:      Please call and advise.    Thank You

## 2022-07-11 ENCOUNTER — TELEPHONE (OUTPATIENT)
Dept: PRIMARY CARE CLINIC | Facility: CLINIC | Age: 67
End: 2022-07-11
Payer: COMMERCIAL

## 2022-07-11 DIAGNOSIS — Z01.818 PREOP EXAMINATION: Primary | ICD-10-CM

## 2022-07-11 NOTE — TELEPHONE ENCOUNTER
Orders placed for pre-op clearance.      Advise patient get this completed several days before next appt.

## 2022-07-11 NOTE — TELEPHONE ENCOUNTER
----- Message from Leah Vargas sent at 7/11/2022  3:46 PM CDT -----  Contact: Self [phone]  Pt requesting a call for cataract clearance by 7/26/22 .    Please call and advise

## 2022-07-11 NOTE — TELEPHONE ENCOUNTER
Patient needs labs to go to [facility] for his cataract surgery clearance.  He is scheduled with you on 7/25 for his office visit.  He needs to go to [facility] this week due to going out of town on 7/16.    Let me know once labs are in.

## 2022-07-11 NOTE — TELEPHONE ENCOUNTER
----- Message from Leah Vargas sent at 7/11/2022  3:46 PM CDT -----  Contact: Self [phone]  Pt requesting a call for cataract clearance by 7/26/22 .    Please call and advise

## 2022-07-14 LAB
ALBUMIN SERPL-MCNC: 3.9 G/DL (ref 3.6–5.1)
ALBUMIN/GLOB SERPL: 1.8 (CALC) (ref 1–2.5)
ALP SERPL-CCNC: 67 U/L (ref 35–144)
ALT SERPL-CCNC: 15 U/L (ref 9–46)
APTT PPP: 29 SEC (ref 23–32)
AST SERPL-CCNC: 18 U/L (ref 10–35)
BASOPHILS # BLD AUTO: 41 CELLS/UL (ref 0–200)
BASOPHILS NFR BLD AUTO: 0.6 %
BILIRUB SERPL-MCNC: 0.9 MG/DL (ref 0.2–1.2)
BUN SERPL-MCNC: 14 MG/DL (ref 7–25)
BUN/CREAT SERPL: NORMAL (CALC) (ref 6–22)
CALCIUM SERPL-MCNC: 8.9 MG/DL (ref 8.6–10.3)
CHLORIDE SERPL-SCNC: 106 MMOL/L (ref 98–110)
CHOLEST SERPL-MCNC: 184 MG/DL
CHOLEST/HDLC SERPL: 3.8 (CALC)
CO2 SERPL-SCNC: 29 MMOL/L (ref 20–32)
COMPLEXED PSA SERPL-MCNC: 0.74 NG/ML
CREAT SERPL-MCNC: 1.01 MG/DL (ref 0.7–1.35)
EGFR: 82 ML/MIN/1.73M2
EOSINOPHIL # BLD AUTO: 152 CELLS/UL (ref 15–500)
EOSINOPHIL NFR BLD AUTO: 2.2 %
ERYTHROCYTE [DISTWIDTH] IN BLOOD BY AUTOMATED COUNT: 13.1 % (ref 11–15)
GLOBULIN SER CALC-MCNC: 2.2 G/DL (CALC) (ref 1.9–3.7)
GLUCOSE SERPL-MCNC: 92 MG/DL (ref 65–99)
HCT VFR BLD AUTO: 41.1 % (ref 38.5–50)
HDLC SERPL-MCNC: 49 MG/DL
HGB BLD-MCNC: 13.4 G/DL (ref 13.2–17.1)
INR PPP: 1
LDLC SERPL CALC-MCNC: 111 MG/DL (CALC)
LYMPHOCYTES # BLD AUTO: 2077 CELLS/UL (ref 850–3900)
LYMPHOCYTES NFR BLD AUTO: 30.1 %
MCH RBC QN AUTO: 32.1 PG (ref 27–33)
MCHC RBC AUTO-ENTMCNC: 32.6 G/DL (ref 32–36)
MCV RBC AUTO: 98.6 FL (ref 80–100)
MONOCYTES # BLD AUTO: 600 CELLS/UL (ref 200–950)
MONOCYTES NFR BLD AUTO: 8.7 %
NEUTROPHILS # BLD AUTO: 4030 CELLS/UL (ref 1500–7800)
NEUTROPHILS NFR BLD AUTO: 58.4 %
NONHDLC SERPL-MCNC: 135 MG/DL (CALC)
PLATELET # BLD AUTO: 180 THOUSAND/UL (ref 140–400)
PMV BLD REES-ECKER: 11.4 FL (ref 7.5–12.5)
POTASSIUM SERPL-SCNC: 4.3 MMOL/L (ref 3.5–5.3)
PROT SERPL-MCNC: 6.1 G/DL (ref 6.1–8.1)
PROTHROMBIN TIME: 9.9 SEC (ref 9–11.5)
RBC # BLD AUTO: 4.17 MILLION/UL (ref 4.2–5.8)
SODIUM SERPL-SCNC: 140 MMOL/L (ref 135–146)
TRIGL SERPL-MCNC: 129 MG/DL
TSH SERPL-ACNC: 2.05 MIU/L (ref 0.4–4.5)
WBC # BLD AUTO: 6.9 THOUSAND/UL (ref 3.8–10.8)

## 2022-07-21 ENCOUNTER — PATIENT OUTREACH (OUTPATIENT)
Dept: ADMINISTRATIVE | Facility: HOSPITAL | Age: 67
End: 2022-07-21
Payer: COMMERCIAL

## 2022-07-25 ENCOUNTER — OFFICE VISIT (OUTPATIENT)
Dept: PRIMARY CARE CLINIC | Facility: CLINIC | Age: 67
End: 2022-07-25
Payer: MEDICARE

## 2022-07-25 VITALS
BODY MASS INDEX: 30.22 KG/M2 | OXYGEN SATURATION: 97 % | DIASTOLIC BLOOD PRESSURE: 78 MMHG | WEIGHT: 223.13 LBS | SYSTOLIC BLOOD PRESSURE: 138 MMHG | TEMPERATURE: 97 F | HEIGHT: 72 IN | HEART RATE: 53 BPM | RESPIRATION RATE: 16 BRPM

## 2022-07-25 DIAGNOSIS — Z12.11 COLON CANCER SCREENING: ICD-10-CM

## 2022-07-25 DIAGNOSIS — Z12.5 SCREENING PSA (PROSTATE SPECIFIC ANTIGEN): ICD-10-CM

## 2022-07-25 DIAGNOSIS — Z01.818 PREOP EXAMINATION: Primary | ICD-10-CM

## 2022-07-25 PROCEDURE — 99214 PR OFFICE/OUTPT VISIT, EST, LEVL IV, 30-39 MIN: ICD-10-PCS | Mod: S$PBB,,, | Performed by: STUDENT IN AN ORGANIZED HEALTH CARE EDUCATION/TRAINING PROGRAM

## 2022-07-25 PROCEDURE — 99214 OFFICE O/P EST MOD 30 MIN: CPT | Mod: S$PBB,,, | Performed by: STUDENT IN AN ORGANIZED HEALTH CARE EDUCATION/TRAINING PROGRAM

## 2022-07-25 PROCEDURE — 99999 PR PBB SHADOW E&M-EST. PATIENT-LVL IV: CPT | Mod: PBBFAC,,, | Performed by: STUDENT IN AN ORGANIZED HEALTH CARE EDUCATION/TRAINING PROGRAM

## 2022-07-25 PROCEDURE — 99214 OFFICE O/P EST MOD 30 MIN: CPT | Mod: PBBFAC,PN | Performed by: STUDENT IN AN ORGANIZED HEALTH CARE EDUCATION/TRAINING PROGRAM

## 2022-07-25 PROCEDURE — 99999 PR PBB SHADOW E&M-EST. PATIENT-LVL IV: ICD-10-PCS | Mod: PBBFAC,,, | Performed by: STUDENT IN AN ORGANIZED HEALTH CARE EDUCATION/TRAINING PROGRAM

## 2022-07-25 NOTE — PROGRESS NOTES
Subjective:           Patient ID: Michael Rivero is a 66 y.o. male who presents today with a chief complaint of pre-op cateract.    Chief Complaint:   No chief complaint on file.      History of Present Illness:    Presenting for pre-op clearance.  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      Any prior reaction to anaesthesia: No  History of prolonged bleeding after surgery or injury?: No  High risk surgery?: No  History of MI, abnormal stress, anginal chest pain, stent, NTG use?: No  History of CHF?: No  History of TIA or stroke?: No   On insulin?:  No  Pre-op Cr >2 mg/dL?: 1.01    Mallampati score: 2  (class 1 complete visualization to class 4 obscure)    Gupra Perioperative Risk for MI or Cardiac Event: 0.1% risk per calculator.    Colon cancer screen:  Is due past 1 year.       Prostate discussion:  Has been seeing provider for this.  Nocturia, but with drinking more.  No hesitancy. No decreased stream.  PSA was checked recently and was 0.74 this summer.    Review of Systems   Constitutional: Negative for activity change and chills.   HENT: Negative for hearing loss, rhinorrhea and trouble swallowing.    Eyes: Positive for visual disturbance. Negative for discharge.   Respiratory: Negative for chest tightness and wheezing.    Cardiovascular: Negative for chest pain and palpitations.   Gastrointestinal: Negative for blood in stool, constipation, diarrhea and vomiting.   Endocrine: Negative for polydipsia and polyuria.   Genitourinary: Negative for difficulty urinating, hematuria and urgency.   Musculoskeletal: Negative for arthralgias, joint swelling and neck pain.   Neurological: Negative for weakness and headaches.   Psychiatric/Behavioral: Negative for confusion and dysphoric mood.           Objective:        Vitals:    07/25/22 0900   BP: 138/78   BP Location: Right arm   Patient Position: Sitting   BP Method: Large (Manual)   Pulse: (!) 53   Resp: 16   Temp: 97.4 °F (36.3 °C)   TempSrc:  Oral   SpO2: 97%   Weight: 101.2 kg (223 lb 1.7 oz)   Height: 6' (1.829 m)       Body mass index is 30.26 kg/m².      Physical Exam  Constitutional:       General: He is not in acute distress.     Appearance: Normal appearance. He is obese. He is not ill-appearing or toxic-appearing.   HENT:      Head: Normocephalic and atraumatic.      Right Ear: External ear normal.      Left Ear: External ear normal.      Nose: No congestion.      Mouth/Throat:      Mouth: Mucous membranes are moist.      Pharynx: Oropharynx is clear.   Eyes:      Extraocular Movements: Extraocular movements intact.      Pupils: Pupils are equal, round, and reactive to light.   Cardiovascular:      Rate and Rhythm: Normal rate and regular rhythm.      Heart sounds: No murmur heard.  Pulmonary:      Effort: Pulmonary effort is normal. No respiratory distress.      Breath sounds: No wheezing.   Abdominal:      General: Bowel sounds are normal.      Palpations: Abdomen is soft.   Musculoskeletal:         General: No swelling.      Cervical back: Normal range of motion.   Skin:     General: Skin is warm.      Capillary Refill: Capillary refill takes less than 2 seconds.      Coloration: Skin is not jaundiced.      Findings: Lesion present.      Comments: Right wrist with several small healing blisters from cooking burn.   Neurological:      General: No focal deficit present.      Mental Status: He is alert and oriented to person, place, and time.      Motor: No weakness.   Psychiatric:         Mood and Affect: Mood normal.             Lab Results   Component Value Date     07/13/2022    K 4.3 07/13/2022     07/13/2022    CO2 29 07/13/2022    BUN 14 07/13/2022    CREATININE 1.01 07/13/2022    ANIONGAP 9 01/04/2018     No results found for: HGBA1C  No results found for: BNP, BNPTRIAGEBLO    Lab Results   Component Value Date    WBC 6.9 07/13/2022    HGB 13.4 07/13/2022    HCT 41.1 07/13/2022     07/13/2022    GRAN 7.6 01/04/2018     GRAN 79.4 (H) 01/04/2018     Lab Results   Component Value Date    CHOL 184 07/13/2022    HDL 49 07/13/2022    LDLCALC 111 (H) 07/13/2022    TRIG 129 07/13/2022          Current Outpatient Medications:     allopurinoL (ZYLOPRIM) 300 MG tablet, Take 1 tablet (300 mg total) by mouth once daily., Disp: 90 tablet, Rfl: 3    COLCRYS 0.6 mg tablet, Take 0.6 mg by mouth once daily. An needed for gout, Disp: , Rfl:     metoprolol succinate (TOPROL-XL) 50 MG 24 hr tablet, Take 50 mg by mouth 2 (two) times a day., Disp: , Rfl:     multivitamin with minerals tablet, Take 1 tablet by mouth once daily., Disp: , Rfl:     olmesartan (BENICAR) 20 MG tablet, Take 10 mg by mouth once daily., Disp: , Rfl:      Outpatient Encounter Medications as of 7/25/2022   Medication Sig Dispense Refill    allopurinoL (ZYLOPRIM) 300 MG tablet Take 1 tablet (300 mg total) by mouth once daily. 90 tablet 3    COLCRYS 0.6 mg tablet Take 0.6 mg by mouth once daily. An needed for gout      metoprolol succinate (TOPROL-XL) 50 MG 24 hr tablet Take 50 mg by mouth 2 (two) times a day.      multivitamin with minerals tablet Take 1 tablet by mouth once daily.      olmesartan (BENICAR) 20 MG tablet Take 10 mg by mouth once daily.      [DISCONTINUED] pantoprazole (PROTONIX) 40 MG tablet TAKE 1 TABLET BY MOUTH EVERY DAY 90 tablet 1    [DISCONTINUED] rosuvastatin (CRESTOR) 5 MG tablet Take 5 mg by mouth once daily.       No facility-administered encounter medications on file as of 7/25/2022.          Assessment:       1. Preop examination    2. Colon cancer screening           Plan:       Preop examination    Colon cancer screening  -     Case Request Endoscopy: COLONOSCOPY       Pre-op:   - patient to have cateract surgery on 8/3/2022.    - is medically cleared based of exam and recent labs.    - faxing clearance to provider's office.     Any prior reaction to anaesthesia: No  History of prolonged bleeding after surgery or injury?: No  High risk  surgery?: No  History of MI, abnormal stress, anginal chest pain, stent, NTG use?: No  History of CHF?: No  History of TIA or stroke?: No   On insulin?:  No  Pre-op Cr >2 mg/dL?: 1.01  Mallampati score: 2  (class 1 complete visualization to class 4 obscure)  Gupra Perioperative Risk for MI or Cardiac Event: 0.1% risk per calculator.    Screening:   - colonoscopy due, testing ordered.   - PSA was checked by outside provider this summer, patient states level was 0.74 and provider stated his exam showed a typically 67yo prostate - slightly large but without any discrete lesions.

## 2022-07-25 NOTE — PATIENT INSTRUCTIONS
Pre-op:   - patient to have cateract surgery on 8/3/2022.    - is medically cleared based of exam and recent labs.    - faxing clearance to provider's office.     Any prior reaction to anaesthesia: No  History of prolonged bleeding after surgery or injury?: No  High risk surgery?: No  History of MI, abnormal stress, anginal chest pain, stent, NTG use?: No  History of CHF?: No  History of TIA or stroke?: No   On insulin?:  No  Pre-op Cr >2 mg/dL?: 1.01  Mallampati score: 2  (class 1 complete visualization to class 4 obscure)  Gupra Perioperative Risk for MI or Cardiac Event: 0.1% risk per calculator.    Screening:   - colonoscopy due, testing ordered.   - PSA was checked by outside provider this summer, patient states level was 0.74 and provider stated his exam showed a typically 65yo prostate - slightly large but without any discrete lesions.

## 2022-07-29 DIAGNOSIS — M10.9 GOUT SYNOVITIS: ICD-10-CM

## 2022-07-29 DIAGNOSIS — K21.9 GASTROESOPHAGEAL REFLUX DISEASE WITHOUT ESOPHAGITIS: ICD-10-CM

## 2022-07-29 RX ORDER — ALLOPURINOL 300 MG/1
TABLET ORAL
Qty: 90 TABLET | Refills: 3 | Status: SHIPPED | OUTPATIENT
Start: 2022-07-29 | End: 2023-11-28

## 2022-07-29 NOTE — TELEPHONE ENCOUNTER
Refill Routing Note   Medication(s) are not appropriate for processing by [facility] for the following reason(s):      - Required laboratory values are outdated    [facility] action(s):  Defer          Medication reconciliation completed: No     Appointments  past 12m or future 3m with PCP    Date Provider   Last Visit   7/25/2022 Braeden Li MD   Next Visit   Visit date not found Braeden Li MD   ED visits in past 90 days: 0        Note composed:5:06 PM 07/29/2022

## 2022-07-29 NOTE — TELEPHONE ENCOUNTER
Care Due:                  Date            Visit Type   Department     Provider  --------------------------------------------------------------------------------                                EP -                              PRIMARY      [facility]  Last Visit: 07-      CARE ([facility])   PRIMARY CARE   Braeden Li  Next Visit: None Scheduled  None         None Found                                                            Last  Test          Frequency    Reason                     Performed    Due Date  --------------------------------------------------------------------------------    Uric Acid...  12 months..  allopurinoL..............  04- 04-    [facility] Embedded Care Gaps. Reference number: [id number]. 7/29/2022   11:08:17 AM CDT

## 2022-08-09 ENCOUNTER — PATIENT OUTREACH (OUTPATIENT)
Dept: ADMINISTRATIVE | Facility: HOSPITAL | Age: 67
End: 2022-08-09
Payer: COMMERCIAL

## 2022-08-09 NOTE — PROGRESS NOTES
Chart review done.  updated. Immunizations reviewed & updated. Care Everywhere updated.  Health Maintenance Due   Topic Date Due    Shingles Vaccine (1 of 2) Never done    Pneumococcal Vaccines (Age 65+) (1 - PCV) Never done    Colorectal Cancer Screening  05/19/2021    COVID-19 Vaccine (3 - Booster) 08/12/2021

## 2022-08-24 ENCOUNTER — TELEPHONE (OUTPATIENT)
Dept: GASTROENTEROLOGY | Facility: CLINIC | Age: 67
End: 2022-08-24
Payer: COMMERCIAL

## 2022-08-24 NOTE — TELEPHONE ENCOUNTER
Called pt to schedule colonoscopy. Pt stated he was already scheduled at another facility , asked to be removed of of list

## 2023-04-20 ENCOUNTER — PATIENT OUTREACH (OUTPATIENT)
Dept: ADMINISTRATIVE | Facility: HOSPITAL | Age: 68
End: 2023-04-20
Payer: COMMERCIAL

## 2023-04-20 NOTE — LETTER
AUTHORIZATION FOR RELEASE OF   CONFIDENTIAL INFORMATION    Dear Zeus WESTFALL,    We are seeing Michael Rivero, date of birth 1955, in the clinic at [facility]. Braeden Li MD is the patient's PCP. Michael Rivero has an outstanding lab/procedure at the time we reviewed his chart. In order to help keep his health information updated, he has authorized us to request the following medical record(s):        (  )  MAMMOGRAM                                      ( X )  COLONOSCOPY      (  )  PAP SMEAR                                          (  )  OUTSIDE LAB RESULTS     (  )  DEXA SCAN                                          (  )  EYE EXAM            (  )  FOOT EXAM                                          (  )  ENTIRE RECORD     (  )  OUTSIDE IMMUNIZATIONS                 (  )  _______________         [phone]     Attn: Carolee          Patient Name: Michael Rivero  : 1955  Patient Phone #: [phone]

## 2023-04-21 ENCOUNTER — PATIENT MESSAGE (OUTPATIENT)
Dept: ADMINISTRATIVE | Facility: HOSPITAL | Age: 68
End: 2023-04-21
Payer: COMMERCIAL

## 2023-05-09 ENCOUNTER — PATIENT OUTREACH (OUTPATIENT)
Dept: ADMINISTRATIVE | Facility: HOSPITAL | Age: 68
End: 2023-05-09
Payer: COMMERCIAL

## 2023-05-09 NOTE — PROGRESS NOTES
Patient due for the following    Shingles Vaccine (1 of 2)    Pneumococcal Vaccines (Age 65+) (1 - PCV)    COVID-19 Vaccine (4 - Booster)    Colorectal Cancer Screening       Immunizations: reviewed and updated  Care Everywhere: triggered  Care Teams: up to date  Outreach: none needed

## 2023-05-31 LAB — CRC RECOMMENDATION EXT: NORMAL

## 2023-07-18 ENCOUNTER — PATIENT OUTREACH (OUTPATIENT)
Dept: ADMINISTRATIVE | Facility: HOSPITAL | Age: 68
End: 2023-07-18
Payer: COMMERCIAL

## 2023-07-18 NOTE — PROGRESS NOTES
Health Maintenance Due   Topic Date Due    Shingles Vaccine (1 of 2) Never done    Pneumococcal Vaccines (Age 65+) (1 - PCV) Never done    COVID-19 Vaccine (4 - Mixed Product series) 05/07/2021    Colorectal Cancer Screening  05/19/2021    PROSTATE-SPECIFIC ANTIGEN  07/13/2023     Immunizations - reviewed and updated   Care Everywhere - triggered   Care Teams - updated  [facility]. Called for patient in regards to scheduling annual exam with PCP. No answer, LM on VM.

## 2023-11-27 DIAGNOSIS — K21.9 GASTROESOPHAGEAL REFLUX DISEASE WITHOUT ESOPHAGITIS: ICD-10-CM

## 2023-11-27 DIAGNOSIS — M10.9 GOUT SYNOVITIS: ICD-10-CM

## 2023-11-27 NOTE — TELEPHONE ENCOUNTER
Care Due:                  Date            Visit Type   Department     Provider  --------------------------------------------------------------------------------                                EP                               PRIMARY      [facility]  Last Visit: 07-      CARE ([facility])   PRIMARY CARE   Braeden Li                              [facility]                              ANNUAL                              CHECKUP/PHY  [facility]  Next Visit: 12-      S            PRIMARY CARE   Braeden Li                                                            Last  Test          Frequency    Reason                     Performed    Due Date  --------------------------------------------------------------------------------    CBC.........  12 months..  allopurinoL..............  Not Found    Overdue    CMP.........  12 months..  allopurinoL..............  07- 07-    Uric Acid...  12 months..  allopurinoL..............  Not Found    Overdue    [employer] Embedded Care Due Messages. Reference number: [id number].   11/27/2023 1:30:07 PM CST

## 2023-11-28 RX ORDER — ALLOPURINOL 300 MG/1
TABLET ORAL
Qty: 30 TABLET | Refills: 1 | Status: SHIPPED | OUTPATIENT
Start: 2023-11-28 | End: 2023-12-18

## 2023-11-28 NOTE — TELEPHONE ENCOUNTER
Refill Routing Note   Medication(s) are not appropriate for processing by [facility] for the following reason(s):        Required labs outdated  Patient not seen by provider within 15 months    [facility] action(s):  Defer     Requires labs : Yes             Appointments  past 12m or future 3m with PCP    Date Provider   Last Visit   7/25/2022 Braeden Li MD   Next Visit   12/19/2023 Braeden Li MD   ED visits in past 90 days: 0        Note composed:8:33 AM 11/28/2023

## 2023-12-18 ENCOUNTER — OFFICE VISIT (OUTPATIENT)
Dept: PRIMARY CARE CLINIC | Facility: CLINIC | Age: 68
End: 2023-12-18
Payer: MEDICARE

## 2023-12-18 VITALS
DIASTOLIC BLOOD PRESSURE: 88 MMHG | WEIGHT: 228.5 LBS | SYSTOLIC BLOOD PRESSURE: 138 MMHG | HEART RATE: 65 BPM | RESPIRATION RATE: 16 BRPM | TEMPERATURE: 98 F | HEIGHT: 72 IN | OXYGEN SATURATION: 95 % | BODY MASS INDEX: 30.95 KG/M2

## 2023-12-18 DIAGNOSIS — Z00.00 ANNUAL PHYSICAL EXAM: Primary | ICD-10-CM

## 2023-12-18 DIAGNOSIS — M10.9 GOUT, UNSPECIFIED CAUSE, UNSPECIFIED CHRONICITY, UNSPECIFIED SITE: Chronic | ICD-10-CM

## 2023-12-18 DIAGNOSIS — K21.9 GASTROESOPHAGEAL REFLUX DISEASE, UNSPECIFIED WHETHER ESOPHAGITIS PRESENT: ICD-10-CM

## 2023-12-18 PROCEDURE — 99999 PR PBB SHADOW E&M-EST. PATIENT-LVL V: ICD-10-PCS | Mod: PBBFAC,,, | Performed by: STUDENT IN AN ORGANIZED HEALTH CARE EDUCATION/TRAINING PROGRAM

## 2023-12-18 PROCEDURE — 99215 OFFICE O/P EST HI 40 MIN: CPT | Mod: PBBFAC,PN | Performed by: STUDENT IN AN ORGANIZED HEALTH CARE EDUCATION/TRAINING PROGRAM

## 2023-12-18 PROCEDURE — 99999 PR PBB SHADOW E&M-EST. PATIENT-LVL V: CPT | Mod: PBBFAC,,, | Performed by: STUDENT IN AN ORGANIZED HEALTH CARE EDUCATION/TRAINING PROGRAM

## 2023-12-18 PROCEDURE — 99214 PR OFFICE/OUTPT VISIT, EST, LEVL IV, 30-39 MIN: ICD-10-PCS | Mod: S$PBB,,, | Performed by: STUDENT IN AN ORGANIZED HEALTH CARE EDUCATION/TRAINING PROGRAM

## 2023-12-18 PROCEDURE — 99214 OFFICE O/P EST MOD 30 MIN: CPT | Mod: S$PBB,,, | Performed by: STUDENT IN AN ORGANIZED HEALTH CARE EDUCATION/TRAINING PROGRAM

## 2023-12-18 RX ORDER — FAMOTIDINE 40 MG/1
40 TABLET, FILM COATED ORAL DAILY
Qty: 90 TABLET | Refills: 3 | Status: SHIPPED | OUTPATIENT
Start: 2023-12-18 | End: 2024-12-17

## 2023-12-18 RX ORDER — ALLOPURINOL 300 MG/1
300 TABLET ORAL DAILY
COMMUNITY
End: 2023-12-18 | Stop reason: SDUPTHER

## 2023-12-18 RX ORDER — OLMESARTAN MEDOXOMIL 40 MG/1
40 TABLET ORAL DAILY
COMMUNITY
Start: 2023-12-01

## 2023-12-18 RX ORDER — ALLOPURINOL 300 MG/1
300 TABLET ORAL DAILY
Qty: 90 TABLET | Refills: 3 | Status: SHIPPED | OUTPATIENT
Start: 2023-12-18

## 2023-12-18 NOTE — PROGRESS NOTES
Subjective:           Patient ID: Michael Rivero is a 68 y.o. male who presents today with a chief complaint of Annual Exam  .    Chief Complaint:   Annual Exam      History of Present Illness:    Michael Rivero is a 68 y.o. male who presents today with a chief complaint of Annual Exam  .    States that his BP can get elevated at times, will feel a bit of pressure in the head.  Usually when he is more heavy like now after Thanksgiving.    States weight at home is about 221-222, when really heavy gets up to 228 and BP stays high, if can get weight down closer 205-210 will have a good BP.    Currently taking Olmesartan 40mg daily.  Tolerating well.  States when gets weight down, will start having Othostasis when BP gets down in to the 110 to 120 range - so prefers to keep it up around 130.    Lung: states doing well.    GERD: is having routine heart burn meds.  Was taking a med before and stopped taking it.                        Review of Systems   Constitutional:  Negative for activity change and unexpected weight change.   HENT:  Negative for hearing loss, rhinorrhea and trouble swallowing.    Eyes:  Negative for discharge and visual disturbance.   Respiratory:  Negative for chest tightness and wheezing.    Cardiovascular:  Negative for chest pain and palpitations.   Gastrointestinal:  Negative for blood in stool, constipation, diarrhea and vomiting.   Endocrine: Negative for polydipsia and polyuria.   Genitourinary:  Negative for difficulty urinating, hematuria and urgency.   Musculoskeletal:  Negative for arthralgias, joint swelling and neck pain.   Neurological:  Negative for weakness and headaches.   Psychiatric/Behavioral:  Negative for confusion and dysphoric mood.            Objective:        Vitals:    12/18/23 0922 12/18/23 0952 12/18/23 0953   BP: 136/78 (!) 146/72 138/88   BP Location: Right arm Right arm Right arm   Patient Position: Sitting Sitting Sitting   BP Method: Medium (Manual) Medium (Manual)  "Medium (Automatic)   Pulse: 65     Resp: 16     Temp: 97.7 °F (36.5 °C)     TempSrc: Oral     SpO2: 95%     Weight: 103.7 kg (228 lb 8.1 oz)     Height: 6' (1.829 m)         Body mass index is 30.99 kg/m².      Physical Exam  Constitutional:       General: He is not in acute distress.     Appearance: Normal appearance. He is obese. He is not ill-appearing or toxic-appearing.      Comments: As per BMI.   HENT:      Head: Normocephalic and atraumatic.      Right Ear: External ear normal.      Left Ear: External ear normal.      Nose: No congestion.      Mouth/Throat:      Mouth: Mucous membranes are moist.      Pharynx: Oropharynx is clear.   Eyes:      Extraocular Movements: Extraocular movements intact.      Conjunctiva/sclera: Conjunctivae normal.      Pupils: Pupils are equal, round, and reactive to light.   Cardiovascular:      Rate and Rhythm: Normal rate and regular rhythm.      Heart sounds: No murmur heard.  Pulmonary:      Effort: Pulmonary effort is normal. No respiratory distress.      Breath sounds: No wheezing.   Abdominal:      General: Bowel sounds are normal.      Palpations: Abdomen is soft.   Musculoskeletal:         General: No swelling.      Cervical back: Normal range of motion.   Skin:     General: Skin is warm.      Capillary Refill: Capillary refill takes less than 2 seconds.      Coloration: Skin is not jaundiced.      Findings: Lesion present.      Comments: Right wrist with several small healing blisters from cooking burn.   Neurological:      General: No focal deficit present.      Mental Status: He is alert and oriented to person, place, and time.      Motor: No weakness.   Psychiatric:         Mood and Affect: Mood normal.             Lab Results   Component Value Date     07/13/2022    K 4.3 07/13/2022     07/13/2022    CO2 29 07/13/2022    BUN 14 07/13/2022    CREATININE 1.01 07/13/2022    ANIONGAP 9 01/04/2018     No results found for: "HGBA1C"  No results found for: " ""BNP", "BNPTRIAGEBLO"    Lab Results   Component Value Date    WBC 6.9 07/13/2022    HGB 13.4 07/13/2022    HCT 41.1 07/13/2022     07/13/2022    GRAN 7.6 01/04/2018    GRAN 79.4 (H) 01/04/2018     Lab Results   Component Value Date    CHOL 184 07/13/2022    HDL 49 07/13/2022    LDLCALC 111 (H) 07/13/2022    TRIG 129 07/13/2022          Current Outpatient Medications:     COLCRYS 0.6 mg tablet, Take 0.6 mg by mouth once daily. An needed for gout, Disp: , Rfl:     metoprolol succinate (TOPROL-XL) 50 MG 24 hr tablet, Take 50 mg by mouth once daily., Disp: , Rfl:     multivitamin with minerals tablet, Take 1 tablet by mouth once daily., Disp: , Rfl:     olmesartan (BENICAR) 40 MG tablet, Take 40 mg by mouth once daily., Disp: , Rfl:     allopurinoL (ZYLOPRIM) 300 MG tablet, Take 1 tablet (300 mg total) by mouth once daily., Disp: 90 tablet, Rfl: 3    famotidine (PEPCID) 40 MG tablet, Take 1 tablet (40 mg total) by mouth once daily., Disp: 90 tablet, Rfl: 3     Outpatient Encounter Medications as of 12/18/2023   Medication Sig Dispense Refill    COLCRYS 0.6 mg tablet Take 0.6 mg by mouth once daily. An needed for gout      metoprolol succinate (TOPROL-XL) 50 MG 24 hr tablet Take 50 mg by mouth once daily.      multivitamin with minerals tablet Take 1 tablet by mouth once daily.      olmesartan (BENICAR) 40 MG tablet Take 40 mg by mouth once daily.      [DISCONTINUED] allopurinoL (ZYLOPRIM) 300 MG tablet Take 300 mg by mouth once daily.      allopurinoL (ZYLOPRIM) 300 MG tablet Take 1 tablet (300 mg total) by mouth once daily. 90 tablet 3    famotidine (PEPCID) 40 MG tablet Take 1 tablet (40 mg total) by mouth once daily. 90 tablet 3    [DISCONTINUED] allopurinoL (ZYLOPRIM) 300 MG tablet TAKE 1 TABLET BY MOUTH EVERY DAY 30 tablet 1    [DISCONTINUED] olmesartan (BENICAR) 20 MG tablet Take 10 mg by mouth once daily.       No facility-administered encounter medications on file as of 12/18/2023.          Assessment: "       1. Annual physical exam    2. Gastroesophageal reflux disease, unspecified whether esophagitis present    3. Gout, unspecified cause, unspecified chronicity, unspecified site           Plan:       Annual physical exam    Gastroesophageal reflux disease, unspecified whether esophagitis present  -     famotidine (PEPCID) 40 MG tablet; Take 1 tablet (40 mg total) by mouth once daily.  Dispense: 90 tablet; Refill: 3    Gout, unspecified cause, unspecified chronicity, unspecified site  -     allopurinoL (ZYLOPRIM) 300 MG tablet; Take 1 tablet (300 mg total) by mouth once daily.  Dispense: 90 tablet; Refill: 3                 Annual physical exam   - presents for annual exam.  States in good state of health at this time.   - patient had outside labs completed with his cardiologist.  Showed normal blood count, normal kidney liver function, normal lipids.  PSA has increased from 0.70- 0.8 up to about 1.4.  States he will talk to Urology on this.    Hypertension:   - patient's blood pressure has been mildly elevated at times at home, states is worse when his weight is heavier.   - patient takes olmesartan 40 mg daily, states when his weight is near 220 this controls blood pressure well, but when his weight is down to 210, will be too strong and cut dose down to 30 mg daily.    Gastroesophageal reflux disease, unspecified whether esophagitis present  -     famotidine (PEPCID) 40 MG tablet; Take 1 tablet (40 mg total) by mouth once daily.  Dispense: 90 tablet; Refill: 3   - patient with some worsening acid reflux symptoms, worse in the evening or when lying down at night.  Had been using PPI previously but stopped due to concerns about mineral absorption and possible Alzheimer's related issues.  Now using antacid in the evenings.    - Will resume use H2 blocker and see if adequately control symptoms.  If inadequate will increase to PPI.    Gout, unspecified cause, unspecified chronicity, unspecified site  -      allopurinoL (ZYLOPRIM) 300 MG tablet; Take 1 tablet (300 mg total) by mouth once daily.  Dispense: 90 tablet; Refill: 3  - patient has order for allopurinol, states typically takes 1/2 tablet or 150 mg daily, but when his gout is more active will take full 300 mg daily.

## 2023-12-18 NOTE — PATIENT INSTRUCTIONS
Annual physical exam   - presents for annual exam.  States in good state of health at this time.   - patient had outside labs completed with his cardiologist.  Showed normal blood count, normal kidney liver function, normal lipids.  PSA has increased from 0.70- 0.8 up to about 1.4.  States he will talk to Urology on this.    Hypertension:   - patient's blood pressure has been mildly elevated at times at home, states is worse when his weight is heavier.   - patient takes olmesartan 40 mg daily, states when his weight is near 220 this controls blood pressure well, but when his weight is down to 210, will be too strong and cut dose down to 30 mg daily.    Gastroesophageal reflux disease, unspecified whether esophagitis present  -     famotidine (PEPCID) 40 MG tablet; Take 1 tablet (40 mg total) by mouth once daily.  Dispense: 90 tablet; Refill: 3   - patient with some worsening acid reflux symptoms, worse in the evening or when lying down at night.  Had been using PPI previously but stopped due to concerns about mineral absorption and possible Alzheimer's related issues.  Now using antacid in the evenings.    - Will resume use H2 blocker and see if adequately control symptoms.  If inadequate will increase to PPI.    Gout, unspecified cause, unspecified chronicity, unspecified site  -     allopurinoL (ZYLOPRIM) 300 MG tablet; Take 1 tablet (300 mg total) by mouth once daily.  Dispense: 90 tablet; Refill: 3  - patient has order for allopurinol, states typically takes 1/2 tablet or 150 mg daily, but when his gout is more active will take full 300 mg daily.

## 2024-01-31 ENCOUNTER — TELEPHONE (OUTPATIENT)
Dept: PRIMARY CARE CLINIC | Facility: CLINIC | Age: 69
End: 2024-01-31
Payer: COMMERCIAL

## 2024-01-31 NOTE — TELEPHONE ENCOUNTER
----- Message from Charity Zamorano sent at 1/31/2024 10:50 AM CST -----  Contact: [phone] Patient  [email or web address]     Patient is calling for Medical Advice regarding: Tested Covid + 01/30/2024, fever, cough, stuffy head, congestion, flu like symptoms 01/30/2024    How long has patient had these symptoms: 01/28/2024 started with cough    Pharmacy name and phone#:   [id number] - [place] - [address] AT [facility] [address]  [address] [address]  Phone: [phone] Fax: [phone]          Would like response via [employer]:  Call Back Please.  Pt is asking  what is the protocol for covid now? Pt asked if Dr Braeden Li wants to do a virtual appointment or can he call him in something. Please advise. Thank you.     Comments:

## 2024-01-31 NOTE — TELEPHONE ENCOUNTER
Talked to pt informed him that otc meds would be fine I asked that he quarantine until feeling better and 5 days with mask after.

## 2024-05-21 LAB — HBA1C MFR BLD HPLC: 5.4 %

## 2024-06-30 DIAGNOSIS — M10.9 GOUT, UNSPECIFIED CAUSE, UNSPECIFIED CHRONICITY, UNSPECIFIED SITE: Chronic | ICD-10-CM

## 2024-06-30 RX ORDER — ALLOPURINOL 300 MG/1
300 TABLET ORAL
Qty: 90 TABLET | Refills: 0 | OUTPATIENT
Start: 2024-06-30

## 2024-06-30 NOTE — TELEPHONE ENCOUNTER
Refill Routing Note   Medication(s) are not appropriate for processing by [facility] for the following reason(s):        Required labs outdated    [facility] action(s):  Defer   Requires labs : Yes             Appointments  past 12m or future 3m with PCP    Date Provider   Last Visit   Visit date not found Andrews Li MD   Next Visit   Visit date not found Andrews Li MD   ED visits in past 90 days: 0        Note composed:2:47 PM 06/30/2024

## 2024-06-30 NOTE — TELEPHONE ENCOUNTER
Care Due:                  Date            Visit Type   Department     Provider  --------------------------------------------------------------------------------                                MYCHART                              FOLLOWUP/OF  [facility]  Last Visit: 12-      FICE VISIT   PRIMARY CARE   Braeden Li  Next Visit: None Scheduled  None         None Found                                                            Last  Test          Frequency    Reason                     Performed    Due Date  --------------------------------------------------------------------------------    CBC.........  12 months..  allopurinoL..............  Not Found    Overdue    CMP.........  12 months..  allopurinoL, famotidine..  07- 07-    Uric Acid...  12 months..  allopurinoL..............  Not Found    Overdue    [employer] Embedded Care Due Messages. Reference number: [id number].   6/30/2024 2:33:05 PM CDT

## 2024-07-01 RX ORDER — ALLOPURINOL 300 MG/1
300 TABLET ORAL DAILY
Qty: 90 TABLET | Refills: 1 | Status: SHIPPED | OUTPATIENT
Start: 2024-07-01

## 2024-07-01 NOTE — TELEPHONE ENCOUNTER
Please see the attached refill request.Pt last seen by PCP may need lab before refills or with refills --up to PCP

## 2024-09-19 ENCOUNTER — PATIENT MESSAGE (OUTPATIENT)
Dept: PRIMARY CARE CLINIC | Facility: CLINIC | Age: 69
End: 2024-09-19
Payer: COMMERCIAL

## 2024-11-14 LAB
CHOL/HDLC RATIO: 3.7
CHOLESTEROL, TOTAL: 186
HDLC SERPL-MCNC: 50 MG/DL
LDLC SERPL CALC-MCNC: 112 MG/DL
NON HDL CHOL (CALC): 136
PSA, TOTAL (OHS): 1.15 NG/ML
PSA, TOTAL (OHS): 1.15 NG/ML
TRIGL SERPL-MCNC: 129 MG/DL

## 2024-11-19 ENCOUNTER — OFFICE VISIT (OUTPATIENT)
Dept: PRIMARY CARE CLINIC | Facility: CLINIC | Age: 69
End: 2024-11-19
Payer: MEDICARE

## 2024-11-19 VITALS
HEIGHT: 72 IN | SYSTOLIC BLOOD PRESSURE: 128 MMHG | HEART RATE: 60 BPM | RESPIRATION RATE: 17 BRPM | WEIGHT: 230.81 LBS | OXYGEN SATURATION: 99 % | BODY MASS INDEX: 31.26 KG/M2 | DIASTOLIC BLOOD PRESSURE: 70 MMHG

## 2024-11-19 DIAGNOSIS — M10.9 GOUT, UNSPECIFIED CAUSE, UNSPECIFIED CHRONICITY, UNSPECIFIED SITE: Chronic | ICD-10-CM

## 2024-11-19 DIAGNOSIS — Z23 NEED FOR PNEUMOCOCCAL VACCINATION: ICD-10-CM

## 2024-11-19 DIAGNOSIS — Z00.00 ANNUAL PHYSICAL EXAM: Primary | ICD-10-CM

## 2024-11-19 DIAGNOSIS — K21.9 GASTROESOPHAGEAL REFLUX DISEASE WITHOUT ESOPHAGITIS: ICD-10-CM

## 2024-11-19 DIAGNOSIS — I10 PRIMARY HYPERTENSION: Chronic | ICD-10-CM

## 2024-11-19 DIAGNOSIS — E78.2 MIXED HYPERLIPIDEMIA: ICD-10-CM

## 2024-11-19 DIAGNOSIS — Z79.899 OTHER LONG TERM (CURRENT) DRUG THERAPY: ICD-10-CM

## 2024-11-19 PROCEDURE — 99999 PR PBB SHADOW E&M-EST. PATIENT-LVL III: CPT | Mod: PBBFAC,,, | Performed by: STUDENT IN AN ORGANIZED HEALTH CARE EDUCATION/TRAINING PROGRAM

## 2024-11-19 PROCEDURE — 99999PBSHW PR PBB SHADOW TECHNICAL ONLY FILED TO HB: Mod: PBBFAC,,,

## 2024-11-19 PROCEDURE — 99214 OFFICE O/P EST MOD 30 MIN: CPT | Mod: S$PBB,,, | Performed by: STUDENT IN AN ORGANIZED HEALTH CARE EDUCATION/TRAINING PROGRAM

## 2024-11-19 PROCEDURE — 99213 OFFICE O/P EST LOW 20 MIN: CPT | Mod: PBBFAC,PN | Performed by: STUDENT IN AN ORGANIZED HEALTH CARE EDUCATION/TRAINING PROGRAM

## 2024-11-19 PROCEDURE — G0009 ADMIN PNEUMOCOCCAL VACCINE: HCPCS | Mod: PBBFAC,PN

## 2024-11-19 PROCEDURE — 90677 PCV20 VACCINE IM: CPT | Mod: PBBFAC,PN

## 2024-11-19 RX ORDER — OLMESARTAN MEDOXOMIL 40 MG/1
40 TABLET ORAL DAILY
Qty: 90 TABLET | Refills: 3 | Status: SHIPPED | OUTPATIENT
Start: 2024-11-19

## 2024-11-19 RX ORDER — METOPROLOL SUCCINATE 50 MG/1
50 TABLET, EXTENDED RELEASE ORAL DAILY
Qty: 90 TABLET | Refills: 3 | Status: SHIPPED | OUTPATIENT
Start: 2024-11-19

## 2024-11-19 RX ORDER — CHLORTHALIDONE 25 MG/1
25 TABLET ORAL
COMMUNITY
Start: 2024-05-30

## 2024-11-19 RX ORDER — COLCHICINE 0.6 MG/1
0.6 TABLET, FILM COATED ORAL DAILY PRN
Qty: 20 TABLET | Refills: 0 | Status: SHIPPED | OUTPATIENT
Start: 2024-11-19

## 2024-11-19 RX ADMIN — PNEUMOCOCCAL 20-VALENT CONJUGATE VACCINE 0.5 ML
2.2; 2.2; 2.2; 2.2; 2.2; 2.2; 2.2; 2.2; 2.2; 2.2; 2.2; 2.2; 2.2; 2.2; 2.2; 2.2; 4.4; 2.2; 2.2; 2.2 INJECTION, SUSPENSION INTRAMUSCULAR at 03:11

## 2024-11-19 NOTE — PROGRESS NOTES
Verified pt ID using name and . NKDAEleno GUZMAN CMA, Administered Prevnar 20 vaccine IM in the left deltoid  per physician order using aseptic technique. Aspirated and no blood return noted. Pt tolerated well with no adverse reactions noted.

## 2024-11-19 NOTE — PROGRESS NOTES
Assessment:       1. Annual physical exam    2. Primary hypertension    3. Mixed hyperlipidemia    4. Gastroesophageal reflux disease without esophagitis    5. Gout, unspecified cause, unspecified chronicity, unspecified site    6. Other long term (current) drug therapy    7. Need for pneumococcal vaccination           Plan:     Assessment & Plan    Reviewed patient's cholesterol levels, noting LDL of 111 mg/dL as acceptable given patient's risk factors  Assessed PSA trend, noting increase from baseline 0.7-0.8 to 1.47 2 years ago, with recent decrease to 1.1  Evaluated blood pressure management, considering patient's symptoms with lower BP (105/70) and medication adjustments  Discussed gout management, noting patient's positive response to continuing allopurinol during flares  Considered pneumococcal vaccination given patient's age and lack of prior vaccination    HYPERCHOLESTEROLEMIA:  - Emphasized the importance of HDL/LDL ratio in assessing cardiovascular risk.    GOUT:  - Discussed the variability in allopurinol management during gout flares.  - Continued allopurinol 150 mg (half of 300 mg tablet) daily.  - Refilled Colchicine 0.6 mg, 20 tablets, for gout flares.  - Take 1 tablet daily at onset of flare.    HYPERTENSION:  - Continued metoprolol succinate 50 mg daily.  - Continued Olmesartan, patient to adjust between 20-40 mg based on blood pressure readings.  - Refilled metoprolol and Olmesartan for 90-day supply.    BENIGN PROSTATIC HYPERPLASIA:  - Continued Pepsod daily.    IMMUNIZATION:  - Provided information on the benefits of pneumococcal vaccination, particularly in reducing meningitis risk.  - Administered first dose of pneumococcal vaccine (PCB).  - Follow up in 1 year for second dose of pneumococcal vaccine.             Annual physical exam  -     metoprolol succinate (TOPROL-XL) 50 MG 24 hr tablet; Take 1 tablet (50 mg total) by mouth once daily.  Dispense: 90 tablet; Refill: 3  -     olmesartan  (BENICAR) 40 MG tablet; Take 1 tablet (40 mg total) by mouth once daily.  Dispense: 90 tablet; Refill: 3    Primary hypertension  -     metoprolol succinate (TOPROL-XL) 50 MG 24 hr tablet; Take 1 tablet (50 mg total) by mouth once daily.  Dispense: 90 tablet; Refill: 3  -     olmesartan (BENICAR) 40 MG tablet; Take 1 tablet (40 mg total) by mouth once daily.  Dispense: 90 tablet; Refill: 3    Mixed hyperlipidemia    Gastroesophageal reflux disease without esophagitis    Gout, unspecified cause, unspecified chronicity, unspecified site  -     COLCRYS 0.6 mg tablet; Take 1 tablet (0.6 mg total) by mouth daily as needed (gout flare). An needed for gout  Dispense: 20 tablet; Refill: 0    Other long term (current) drug therapy  -     COLCRYS 0.6 mg tablet; Take 1 tablet (0.6 mg total) by mouth daily as needed (gout flare). An needed for gout  Dispense: 20 tablet; Refill: 0  -     metoprolol succinate (TOPROL-XL) 50 MG 24 hr tablet; Take 1 tablet (50 mg total) by mouth once daily.  Dispense: 90 tablet; Refill: 3  -     olmesartan (BENICAR) 40 MG tablet; Take 1 tablet (40 mg total) by mouth once daily.  Dispense: 90 tablet; Refill: 3    Need for pneumococcal vaccination  -     (VFC) PCV20 (Prevnar 20) IM vaccine (>/= 6 wks)                This note was generated with the assistance of ambient listening technology. Verbal consent was obtained by the patient and accompanying visitor(s) for the recording of patient appointment to facilitate this note. I attest to having reviewed and edited the generated note for accuracy, though some syntax or spelling errors may persist. Please contact the author of this note for any clarification.      Subjective:           Patient ID: Michael Rivero   Age:  69 y.o.  Sex: male     Chief Complaint:   Annual Exam      History of Present Illness:    Micahel Rivero is a 69 y.o. male who presents today with a chief complaint of Annual Exam  .        History of Present Illness    CHIEF  "COMPLAINT:  Michael presents today for an annual visit to refill medications.    MEDICATIONS:  He takes metoprolol succinate 50 mg daily, Pepsod daily, and allopurinol 150 mg daily for gout management. He alternates between olmesartan 40 mg and 20 mg depending on his blood pressure and activity level. He uses colchicine as needed for gout flares, which occur approximately once a year.    CARDIOVASCULAR:  He reports having recent lab work done for his cardiologist within the last two weeks. His LDL cholesterol is slightly elevated and HDL cholesterol is slightly low. His total cholesterol is not within normal range, but he is not overly concerned about these results. He experiences symptoms of low blood pressure, including feeling lightheaded and experiencing transient vision changes when standing up, when his blood pressure drops to around 105/70. He has stopped taking chlorthalidone, which was previously prescribed as a diuretic.    GOUT:  He experiences gout flares approximately once a year and feels "gout-timothy" frequently. After initiating allopurinol, he experienced an increase in flare frequency. He recalls a severe flare after starting allopurinol, lasting from 7 PM to 4 AM, causing intense pain. He expresses wariness about discontinuing and restarting allopurinol due to this experience. He has received conflicting advice from different healthcare providers regarding allopurinol use during flares.    GENITOURINARY:  He has an upcoming appointment with a urologist next week. His PSA levels have historically been 0.7-0.8. Two years ago, his PSA increased to 1.47, which was abnormal for him. His most recent PSA test showed a level of approximately 1.1. He acknowledges experiencing some urinary symptoms but considers them normal. He reports nocturia but views this positively. He denies concerns about decreased urine stream, difficulty initiating urination, or post-void dribbling.    IMMUNIZATIONS:  He reports no " history of receiving flu vaccines or pneumonia vaccines. He expresses a preference for pneumonia vaccination over flu vaccines, citing a belief that most flu-related deaths are due to pneumonia complications.      ROS:  Eyes: +vision changes  Genitourinary: -frequency, +nocturia, -difficulty urinating  Musculoskeletal: +joint pain  Neurological: +lightheadedness                                                 Review of Systems   Constitutional:  Negative for activity change and unexpected weight change.   HENT:  Negative for hearing loss, rhinorrhea and trouble swallowing.    Eyes:  Negative for discharge and visual disturbance.   Respiratory:  Negative for chest tightness and wheezing.    Cardiovascular:  Negative for chest pain and palpitations.   Gastrointestinal:  Negative for blood in stool, constipation, diarrhea and vomiting.   Endocrine: Negative for polydipsia and polyuria.   Genitourinary:  Negative for difficulty urinating, hematuria and urgency.   Musculoskeletal:  Negative for arthralgias, joint swelling and neck pain.   Neurological:  Negative for weakness and headaches.   Psychiatric/Behavioral:  Negative for confusion and dysphoric mood.            Objective:        Vitals:    11/19/24 1422   BP: 128/70   BP Location: Right arm   Patient Position: Sitting   Pulse: 60   Resp: 17   SpO2: 99%   Weight: 104.7 kg (230 lb 13.2 oz)   Height: 6' (1.829 m)       Body mass index is 31.31 kg/m².      Physical Exam  Vitals reviewed.   Constitutional:       General: He is not in acute distress.     Appearance: Normal appearance. He is not ill-appearing.      Comments: As per BMI.   HENT:      Head: Normocephalic and atraumatic.      Right Ear: External ear normal.      Left Ear: External ear normal.      Nose: Nose normal.   Eyes:      Extraocular Movements: Extraocular movements intact.      Conjunctiva/sclera: Conjunctivae normal.   Cardiovascular:      Rate and Rhythm: Normal rate and regular rhythm.       "Pulses: Normal pulses.      Heart sounds: No murmur heard.  Pulmonary:      Effort: Pulmonary effort is normal. No respiratory distress.      Breath sounds: No wheezing.   Abdominal:      Tenderness: There is no right CVA tenderness or left CVA tenderness.   Musculoskeletal:         General: No swelling or deformity.      Cervical back: Normal range of motion.   Skin:     Capillary Refill: Capillary refill takes less than 2 seconds.      Findings: No lesion.   Neurological:      General: No focal deficit present.      Mental Status: He is alert and oriented to person, place, and time.      Gait: Gait normal.   Psychiatric:         Mood and Affect: Mood normal.         Physical Exam    Extremities: No pitting edema in ankles.               Past Medical History:   Diagnosis Date    Arthritis     gout    Cataract     Gastroesophageal reflux disease without esophagitis 09/11/2020    Gout     Hypertension     Mixed hyperlipidemia 01/18/2019    PVC (premature ventricular contraction)     Skin cancer     pre cancer on nose , under care of dermatologist        Lab Results   Component Value Date     07/13/2022    K 4.3 07/13/2022     07/13/2022    CO2 29 07/13/2022    BUN 14 07/13/2022    CREATININE 1.01 07/13/2022    ANIONGAP 9 01/04/2018     No results found for: "HGBA1C"  No results found for: "BNP", "BNPTRIAGEBLO"    Lab Results   Component Value Date    WBC 6.9 07/13/2022    HGB 13.4 07/13/2022    HCT 41.1 07/13/2022     07/13/2022    GRAN 7.6 01/04/2018    GRAN 79.4 (H) 01/04/2018     Lab Results   Component Value Date    CHOL 184 07/13/2022    HDL 49 07/13/2022    LDLCALC 111 (H) 07/13/2022    TRIG 129 07/13/2022        Outpatient Encounter Medications as of 11/19/2024   Medication Sig Dispense Refill    allopurinoL (ZYLOPRIM) 300 MG tablet Take 1 tablet (300 mg total) by mouth once daily. 90 tablet 1    chlorthalidone (HYGROTEN) 25 MG Tab Take 25 mg by mouth.      famotidine (PEPCID) 40 MG tablet " Take 1 tablet (40 mg total) by mouth once daily. 90 tablet 3    multivitamin with minerals tablet Take 1 tablet by mouth once daily.      [DISCONTINUED] COLCRYS 0.6 mg tablet Take 0.6 mg by mouth once daily. An needed for gout      [DISCONTINUED] metoprolol succinate (TOPROL-XL) 50 MG 24 hr tablet Take 50 mg by mouth once daily.      [DISCONTINUED] olmesartan (BENICAR) 40 MG tablet Take 40 mg by mouth once daily.      COLCRYS 0.6 mg tablet Take 1 tablet (0.6 mg total) by mouth daily as needed (gout flare). An needed for gout 20 tablet 0    metoprolol succinate (TOPROL-XL) 50 MG 24 hr tablet Take 1 tablet (50 mg total) by mouth once daily. 90 tablet 3    olmesartan (BENICAR) 40 MG tablet Take 1 tablet (40 mg total) by mouth once daily. 90 tablet 3     Facility-Administered Encounter Medications as of 11/19/2024   Medication Dose Route Frequency Provider Last Rate Last Admin    [COMPLETED] (VFC) PCV20 (Prevnar 20) IM vaccine (>/= 6 wks)  0.5 mL Intramuscular 1 time in Clinic/HOD Braeden Li MD   0.5 mL at 11/19/24 5521

## 2024-11-20 ENCOUNTER — PATIENT OUTREACH (OUTPATIENT)
Dept: ADMINISTRATIVE | Facility: HOSPITAL | Age: 69
End: 2024-11-20
Payer: COMMERCIAL

## 2024-11-20 NOTE — LETTER
AUTHORIZATION FOR RELEASE OF   CONFIDENTIAL INFORMATION    Dear Dr. Collier,    We are seeing Michael Rivero, date of birth 1955, in the clinic at [facility]. Braeden Li MD is the patient's PCP. Michael Rivero has an outstanding lab/procedure at the time we reviewed his chart. In order to help keep his health information updated, he has authorized us to request the following medical record(s):        (  )  MAMMOGRAM                                      (X)  COLONOSCOPY      (  )  PAP SMEAR                                          (  )  OUTSIDE LAB RESULTS     (  )  DEXA SCAN                                          (  )  EYE EXAM            (  )  FOOT EXAM                                          (  )  ENTIRE RECORD     (  )  OUTSIDE IMMUNIZATIONS                 (  )  _______________       ATTN: FLORA      Please fax records to Braeden Li MD, [phone]     If you have any questions, please contact Flora at [phone]          Patient Name: Michael Rivero  : 1955  Patient Phone #: [phone]

## 2024-11-20 NOTE — PROGRESS NOTES
Health Maintenance Due   Topic Date Due    Shingles Vaccine (1 of 2) Never done    RSV Vaccine (Age 60+ and Pregnant patients) (1 - Risk 60-74 years 1-dose series) Never done    Influenza Vaccine (1) 09/01/2024    COVID-19 Vaccine (4 - 2024-25 season) 09/01/2024     Immunizations - reviewed and updated   Care Everywhere - triggered   Care Teams - updated   Outreach - Patient seen by Dr. Li yesterday, 11/19/2024 to establish care  [facility] reviewed. Lipid panel and PSA done 11/14/2024 and Hemoglobin A1C done 5/21/2024, uploaded to media manager.  updated   ELISA sent to Dr. Collier for most recent colonoscopy records

## 2024-11-21 ENCOUNTER — PATIENT OUTREACH (OUTPATIENT)
Dept: ADMINISTRATIVE | Facility: HOSPITAL | Age: 69
End: 2024-11-21
Payer: COMMERCIAL

## 2024-11-21 NOTE — PROGRESS NOTES
Health Maintenance Due   Topic Date Due    Shingles Vaccine (1 of 2) Never done    RSV Vaccine (Age 60+ and Pregnant patients) (1 - Risk 60-74 years 1-dose series) Never done    Influenza Vaccine (1) 09/01/2024    COVID-19 Vaccine (4 - 2024-25 season) 09/01/2024     Immunizations - reviewed and updated   Care Everywhere - triggered   Care Teams -   Outreach - Colonoscopy done 5/31/2023, uploaded to .  updated

## 2024-12-08 DIAGNOSIS — K21.9 GASTROESOPHAGEAL REFLUX DISEASE, UNSPECIFIED WHETHER ESOPHAGITIS PRESENT: ICD-10-CM

## 2024-12-08 NOTE — TELEPHONE ENCOUNTER
Care Due:                  Date            Visit Type   Department     Provider  --------------------------------------------------------------------------------                                NEW PATIENT                              - OPEN       [facility]  Last Visit: 11-      SCHEDULING   PRIMARY CARE   Braeden Li  Next Visit: None Scheduled  None         None Found                                                            Last  Test          Frequency    Reason                     Performed    Due Date  --------------------------------------------------------------------------------    CBC.........  12 months..  allopurinoL..............  Not Found    Overdue    CMP.........  12 months..  COLCRYS, allopurinoL,      Not Found    Overdue                             famotidine, olmesartan...    Uric Acid...  12 months..  COLCRYS, allopurinoL.....  Not Found    Overdue    [employer] Embedded Care Due Messages. Reference number: [id number].   12/08/2024 3:55:35 AM CST

## 2024-12-09 RX ORDER — FAMOTIDINE 40 MG/1
40 TABLET, FILM COATED ORAL
Qty: 90 TABLET | Refills: 3 | Status: SHIPPED | OUTPATIENT
Start: 2024-12-09

## 2024-12-09 NOTE — TELEPHONE ENCOUNTER
Refill Routing Note   Medication(s) are not appropriate for processing by [facility] for the following reason(s):        Required labs outdated    [facility] action(s):  Defer     Requires labs : Yes             Appointments  past 12m or future 3m with PCP    Date Provider   Last Visit   11/19/2024 Braeden Li MD   Next Visit   Visit date not found Braeden Li MD   ED visits in past 90 days: 0        Note composed:10:12 PM 12/08/2024

## 2025-02-21 DIAGNOSIS — Z00.00 ENCOUNTER FOR MEDICARE ANNUAL WELLNESS EXAM: ICD-10-CM

## 2025-06-23 DIAGNOSIS — M10.9 GOUT, UNSPECIFIED CAUSE, UNSPECIFIED CHRONICITY, UNSPECIFIED SITE: Chronic | ICD-10-CM

## 2025-06-23 RX ORDER — ALLOPURINOL 300 MG/1
300 TABLET ORAL DAILY
Qty: 90 TABLET | Refills: 1 | Status: SHIPPED | OUTPATIENT
Start: 2025-06-23

## 2025-06-23 NOTE — TELEPHONE ENCOUNTER
Care Due:                  Date            Visit Type   Department     Provider  --------------------------------------------------------------------------------                                NEW PATIENT                              - OPEN       [facility] [place] [facility]  Last Visit: 11-      SCHEDULING   PRIMARY CARE   Braeden Li  Next Visit: None Scheduled  None         None Found                                                            Last  Test          Frequency    Reason                     Performed    Due Date  --------------------------------------------------------------------------------    CBC.........  12 months..  allopurinoL..............  Not Found    Overdue    CMP.........  12 months..  COLCRYS, allopurinoL,      Not Found    Overdue                             famotidine, olmesartan...    Uric Acid...  12 months..  COLCRYS, allopurinoL.....  Not Found    Overdue    [employer] Embedded Care Due Messages. Reference number: [id number].   6/23/2025 11:44:03 AM CDT

## (undated) DEVICE — SEE MEDLINE ITEM 146298

## (undated) DEVICE — GLOVE BIOGEL SKINSENSE PI 6.5

## (undated) DEVICE — PADDING CAST SPECIALIST 6X4YD

## (undated) DEVICE — SET IRR URLGY 2LINE UNIV SPIKE

## (undated) DEVICE — Device

## (undated) DEVICE — STRIP STERI REIN CLSR 1/2X2IN

## (undated) DEVICE — CLOSURE SKIN STERI STRIP 1/2X4

## (undated) DEVICE — TOURNIQUET SB QC DP 34X4IN

## (undated) DEVICE — UNDERGLOVE BIOGEL PI SZ 6.5 LF

## (undated) DEVICE — APPLICATOR CHLORAPREP ORN 26ML

## (undated) DEVICE — GAUZE SPONGE 4'X4 12 PLY

## (undated) DEVICE — SOL IRR NACL .9% 3000ML

## (undated) DEVICE — BANDAGE ACE ELASTIC 6"

## (undated) DEVICE — GAUZE SPONGE 4X4 12PLY

## (undated) DEVICE — SEE MEDLINE ITEM 152523

## (undated) DEVICE — BLADE GATOR 4.2

## (undated) DEVICE — GLOVE BIOGEL SKINSENSE PI 8.5

## (undated) DEVICE — SEE MEDLINE ITEM 146231

## (undated) DEVICE — SEE L#120831